# Patient Record
Sex: MALE | Race: WHITE | NOT HISPANIC OR LATINO | Employment: FULL TIME | URBAN - METROPOLITAN AREA
[De-identification: names, ages, dates, MRNs, and addresses within clinical notes are randomized per-mention and may not be internally consistent; named-entity substitution may affect disease eponyms.]

---

## 2017-03-10 ENCOUNTER — OFFICE VISIT (OUTPATIENT)
Dept: ORTHOPEDICS | Facility: CLINIC | Age: 37
End: 2017-03-10
Payer: COMMERCIAL

## 2017-03-10 ENCOUNTER — RADIANT APPOINTMENT (OUTPATIENT)
Dept: GENERAL RADIOLOGY | Facility: CLINIC | Age: 37
End: 2017-03-10
Attending: PEDIATRICS
Payer: COMMERCIAL

## 2017-03-10 VITALS
BODY MASS INDEX: 34.51 KG/M2 | DIASTOLIC BLOOD PRESSURE: 84 MMHG | SYSTOLIC BLOOD PRESSURE: 132 MMHG | HEIGHT: 69 IN | WEIGHT: 233 LBS

## 2017-03-10 DIAGNOSIS — M25.562 PAIN IN BOTH KNEES, UNSPECIFIED CHRONICITY: Primary | ICD-10-CM

## 2017-03-10 DIAGNOSIS — M25.562 LEFT KNEE PAIN, UNSPECIFIED CHRONICITY: ICD-10-CM

## 2017-03-10 DIAGNOSIS — M25.562 PAIN IN BOTH KNEES, UNSPECIFIED CHRONICITY: ICD-10-CM

## 2017-03-10 DIAGNOSIS — M25.561 PAIN IN BOTH KNEES, UNSPECIFIED CHRONICITY: ICD-10-CM

## 2017-03-10 DIAGNOSIS — M25.561 PAIN IN BOTH KNEES, UNSPECIFIED CHRONICITY: Primary | ICD-10-CM

## 2017-03-10 PROCEDURE — 73562 X-RAY EXAM OF KNEE 3: CPT | Mod: RT | Performed by: PEDIATRICS

## 2017-03-10 PROCEDURE — 99204 OFFICE O/P NEW MOD 45 MIN: CPT | Performed by: PEDIATRICS

## 2017-03-10 NOTE — Clinical Note
Geovani GUSMAN was seen in [facility] for knee complaints. MRI left knee next step. Please see copy of the chart note for additional details. Thanks.

## 2017-03-10 NOTE — NURSING NOTE
"Chief Complaint   Patient presents with     Musculoskeletal Problem     bilateral knee pain       Initial /84  Ht 5' 8.5\" (1.74 m)  Wt 233 lb (105.7 kg)  BMI 34.91 kg/m2 Estimated body mass index is 34.91 kg/(m^2) as calculated from the following:    Height as of this encounter: 5' 8.5\" (1.74 m).    Weight as of this encounter: 233 lb (105.7 kg).  Medication Reconciliation: complete       Patient was given home exercise program at the direction of AURELIO SIGALA  that included the following exercise(s) :    Exercise(s) hamstring stretching, calf stretching    Repititions: hold 5 sec, repeat 5x    Times per day: 1-2    Patient demonstrated understanding of and the ability to perform these exercises. Patient was seen for 5 minutes to provide this home exercise program. Patient was directed to discontinue any exercises that cause pain, and to call the clinic if any questions.    "

## 2017-03-10 NOTE — MR AVS SNAPSHOT
After Visit Summary   3/10/2017    Geovani Bella    MRN: [id number]           Patient Information     Date Of Birth          1980        Visit Information        Provider Department      3/10/2017 10:40 AM Abraham Gates,  [facility] [place]        Today's Diagnoses     Pain in both knees, unspecified chronicity    -  1    Left knee pain, unspecified chronicity          Care Instructions     Advanced imaging is done by appointment. Some insurance require a prior authorization to be completed which may delay the time until you are able to schedule your appointment. You should be receiving a call from the scheduling department, if you have not heard from them in 24-48 hours.   Please call [place] [facility]: [phone] to schedule your MRI.  Depending on your availability you can usually schedule within the next 1-2 days.    The clinic will call you with results, if you have not heard from the clinic within 3-4 days following your MRI please contact us at the number listed below.     If you have any further questions for your physician or physician s care team you can call [phone] and use option 3 to leave a voice message. Calls received during business hours will be returned same day.                Follow-ups after your visit        Future tests that were ordered for you today     Open Future Orders        Priority Expected Expires Ordered    MR Knee Left w/o Contrast Routine  3/10/2018 3/10/2017            Who to contact     If you have questions or need follow up information about today's clinic visit or your schedule please contact [facility] [place] directly at [phone].  Normal or non-critical lab and imaging results will be communicated to you by MyChart, letter or phone within 4 business days after the clinic has received the results. If you do not hear from us within 7 days, please contact the  "clinic through [employer] or phone. If you have a critical or abnormal lab result, we will notify you by phone as soon as possible.  Submit refill requests through [employer] or call your pharmacy and they will forward the refill request to us. Please allow 3 business days for your refill to be completed.          Additional Information About Your Visit        [employer] Information     [employer] gives you secure access to your electronic health record. If you see a primary care provider, you can also send messages to your care team and make appointments. If you have questions, please call your primary care clinic.  If you do not have a primary care provider, please call [phone] and they will assist you.        Care EveryWhere ID     This is your Care EveryWhere ID. This could be used by other organizations to access your [place] medical records  [id number]        Your Vitals Were     Height BMI (Body Mass Index)                5' 8.5\" (1.74 m) 34.91 kg/m2           Blood Pressure from Last 3 Encounters:   03/10/17 132/84   11/18/16 122/78   03/13/15 129/70    Weight from Last 3 Encounters:   03/10/17 233 lb (105.7 kg)   11/18/16 235 lb (106.6 kg)   03/13/15 195 lb (88.5 kg)               Primary Care Provider Office Phone # Fax #    Keo Phillips PA-C [phone]       [address] [address]        Thank you!     Thank you for choosing [facility]  for your care. Our goal is always to provide you with excellent care. Hearing back from our patients is one way we can continue to improve our services. Please take a few minutes to complete the written survey that you may receive in the mail after your visit with us. Thank you!             Your Updated Medication List - Protect others around you: Learn how to safely use, store and throw away your medicines at www.disposemymeds.org.          This list is accurate as of: " 3/10/17 11:59 AM.  Always use your most recent med list.                   Brand Name Dispense Instructions for use    albuterol 108 (90 BASE) MCG/ACT Inhaler    PROAIR HFA/PROVENTIL HFA/VENTOLIN HFA    1 Inhaler    Inhale 2 puffs into the lungs every 6 hours as needed for shortness of breath / dyspnea       CLONAZEPAM PO      Take  by mouth 2 times daily.       methylphenidate ER 27 MG CR tablet    CONCERTA     27 mg       * nicotine 14 MG/24HR 24 hr patch    NICODERM CQ    30 patch    Place 1 patch onto the skin every 24 hours       * nicotine 7 MG/24HR 24 hr patch    NICODERM CQ    30 patch    Place 1 patch onto the skin every 24 hours       RELPAX 20 MG tablet   Generic drug:  eletriptan      Take 20 mg by mouth at onset of headache       topiramate 25 MG tablet    TOPAMAX     25 mg       VIAGRA 100 MG cap/tab   Generic drug:  sildenafil      Take 100 mg by mouth daily as needed.       * Notice:  This list has 2 medication(s) that are the same as other medications prescribed for you. Read the directions carefully, and ask your doctor or other care provider to review them with you.

## 2017-03-10 NOTE — PATIENT INSTRUCTIONS
Advanced imaging is done by appointment. Some insurance require a prior authorization to be completed which may delay the time until you are able to schedule your appointment. You should be receiving a call from the scheduling department, if you have not heard from them in 24-48 hours.   Please call [facility] [place] [facility] [place]: [phone] to schedule your MRI.  Depending on your availability you can usually schedule within the next 1-2 days.    The clinic will call you with results, if you have not heard from the clinic within 3-4 days following your MRI please contact us at the number listed below.     If you have any further questions for your physician or physician s care team you can call [phone] and use option 3 to leave a voice message. Calls received during business hours will be returned same day.

## 2017-03-10 NOTE — LETTER
[facility] [place]  [address]  [place] [address]  Phone: [phone]  Fax: [phone]        March 10, 2017      RE:Geovani Bella      To whom it may concern,      Geovani Bella was seen in clinic today.        Sincerely,            Abraham ELIZONDO/navarrete

## 2017-03-20 ENCOUNTER — RADIANT APPOINTMENT (OUTPATIENT)
Dept: MRI IMAGING | Facility: CLINIC | Age: 37
End: 2017-03-20
Attending: PEDIATRICS
Payer: COMMERCIAL

## 2017-03-20 DIAGNOSIS — M25.562 LEFT KNEE PAIN, UNSPECIFIED CHRONICITY: ICD-10-CM

## 2017-03-20 PROCEDURE — 73721 MRI JNT OF LWR EXTRE W/O DYE: CPT | Mod: TC

## 2017-03-21 ENCOUNTER — TELEPHONE (OUTPATIENT)
Dept: ORTHOPEDICS | Facility: CLINIC | Age: 37
End: 2017-03-21

## 2017-03-21 DIAGNOSIS — S83.242A TEAR OF MEDIAL MENISCUS OF LEFT KNEE: Primary | ICD-10-CM

## 2017-03-22 NOTE — TELEPHONE ENCOUNTER
MR LEFT KNEE WITHOUT CONTRAST 3/20/2017 7:33 PM     HISTORY: Medial and anterior knee pain with weakness and instability.  No specific injury.      TECHNIQUE: Axial and coronal T2 with fat suppression. Coronal T1.  Sagittal dual echo T2.     COMPARISON: None.     FINDINGS:   Medial Meniscus: There is a horizontal tear extending from the  periphery to the inferior surface near the free edge throughout the  mid body and the posterior horn. There are two separate associated  parameniscal cysts. One of these is located adjacent to the anterior  aspect of the mid body measuring 1.4 x 0.8 x 2.3 cm. The other is  located adjacent to the junction of the posterior horn and mid body  measuring 1.7 x 0.8 x 1.7 cm. The medial collateral ligament is  interposed between the two parameniscal cysts, and there is no  conspicuous communication between the two cysts (image 13 of series  3). The anterior horn is unremarkable. No displaced meniscal fragments  or flaps.      Lateral Meniscus: No tear, displaced fragment, or extrusion.      Anterior Cruciate Ligament: Unremarkable.      Posterior Cruciate Ligament: Unremarkable.      Medial Collateral Ligament: Unremarkable.     Lateral Collateral Ligament Complex, Popliteus Tendon: The iliotibial  band, fibular collateral ligament, biceps femoris tendon, and  popliteus tendon are unremarkable.     Osseous and Cartilaginous Structures: No acute-appearing bony  abnormalities. There is mild diffuse grade II chondromalacia in the  weightbearing medial compartment. Articular cartilages in the lateral  and patellofemoral compartments appear normal.     Extensor Mechanism: The quadriceps and patellar tendons are  unremarkable. The medial and lateral patellar retinacula are normal.     Joint Space: No joint effusion. No definite loose bodies appreciated.     Additional Findings: Very small popliteal cyst. No  semimembranosus-tibial collateral ligament or pes anserine  bursitis.         IMPRESSION:   1. Horizontal tear involving the mid body and posterior horn of the  medial meniscus. There are two associated parameniscal cysts.  2. 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     LATOYA AMAYA MD

## 2017-03-24 NOTE — TELEPHONE ENCOUNTER
"Medial meniscus tear left. Also some medial chondromalacia.  Options: 1) trial steroid injection for pain relief, 2) rehab (will not \"fix\" tear, but can address knee mechanics), 3) referral to ortho surgeon for further evaluation.  Would offer a steroid injection to start, if he is interested. Thanks.  Abraham Gates, DO, CAQ    "

## 2017-03-27 ENCOUNTER — OFFICE VISIT (OUTPATIENT)
Dept: FAMILY MEDICINE | Facility: CLINIC | Age: 37
End: 2017-03-27
Payer: COMMERCIAL

## 2017-03-27 VITALS
TEMPERATURE: 98.1 F | WEIGHT: 226 LBS | BODY MASS INDEX: 33.86 KG/M2 | HEART RATE: 64 BPM | SYSTOLIC BLOOD PRESSURE: 118 MMHG | DIASTOLIC BLOOD PRESSURE: 80 MMHG

## 2017-03-27 DIAGNOSIS — F41.1 GAD (GENERALIZED ANXIETY DISORDER): Primary | ICD-10-CM

## 2017-03-27 DIAGNOSIS — F33.1 MAJOR DEPRESSIVE DISORDER, RECURRENT EPISODE, MODERATE (H): ICD-10-CM

## 2017-03-27 DIAGNOSIS — F43.10 PTSD (POST-TRAUMATIC STRESS DISORDER): ICD-10-CM

## 2017-03-27 PROCEDURE — 99214 OFFICE O/P EST MOD 30 MIN: CPT | Performed by: PHYSICIAN ASSISTANT

## 2017-03-27 RX ORDER — BUSPIRONE HYDROCHLORIDE 15 MG/1
15 TABLET ORAL 2 TIMES DAILY
Qty: 180 TABLET | Refills: 3 | COMMUNITY
Start: 2017-03-27 | End: 2019-06-27

## 2017-03-27 NOTE — MR AVS SNAPSHOT
After Visit Summary   3/27/2017    Geovani Bella    MRN: [id number]           Patient Information     Date Of Birth          1980        Visit Information        Provider Department      3/27/2017 11:20 AM Keo Phillips PA-C [facility]        Today's Diagnoses     DAVONTE (generalized anxiety disorder)    -  1    Major depressive disorder, recurrent episode, moderate (H)        PTSD (post-traumatic stress disorder)          Care Instructions    [facility] - Therapy - [phone]        Follow-ups after your visit        Who to contact     If you have questions or need follow up information about today's clinic visit or your schedule please contact [facility] directly at [phone].  Normal or non-critical lab and imaging results will be communicated to you by MyChart, letter or phone within 4 business days after the clinic has received the results. If you do not hear from us within 7 days, please contact the clinic through [employer] or phone. If you have a critical or abnormal lab result, we will notify you by phone as soon as possible.  Submit refill requests through [employer] or call your pharmacy and they will forward the refill request to us. Please allow 3 business days for your refill to be completed.          Additional Information About Your Visit        MyChart Information     [employer] gives you secure access to your electronic health record. If you see a primary care provider, you can also send messages to your care team and make appointments. If you have questions, please call your primary care clinic.  If you do not have a primary care provider, please call [phone] and they will assist you.        Care EveryWhere ID     This is your Care EveryWhere ID. This could be used by other organizations to access your [facility] medical records  [id number]        Your Vitals Were     Pulse Temperature BMI (Body Mass Index)             64 98.1   F (36.7  C) (Oral) 33.86 kg/m2          Blood Pressure from Last 3 Encounters:   03/27/17 118/80   03/10/17 132/84   11/18/16 122/78    Weight from Last 3 Encounters:   03/27/17 226 lb (102.5 kg)   03/10/17 233 lb (105.7 kg)   11/18/16 235 lb (106.6 kg)              Today, you had the following     No orders found for display       Primary Care Provider Office Phone # Fax #    Keo Phillips PA-C [phone]       [facility] [address] [address]        Thank you!     Thank you for choosing [facility]  for your care. Our goal is always to provide you with excellent care. Hearing back from our patients is one way we can continue to improve our services. Please take a few minutes to complete the written survey that you may receive in the mail after your visit with us. Thank you!             Your Updated Medication List - Protect others around you: Learn how to safely use, store and throw away your medicines at www.disposemymeds.org.          This list is accurate as of: 3/27/17 12:10 PM.  Always use your most recent med list.                   Brand Name Dispense Instructions for use    albuterol 108 (90 BASE) MCG/ACT Inhaler    PROAIR HFA/PROVENTIL HFA/VENTOLIN HFA    1 Inhaler    Inhale 2 puffs into the lungs every 6 hours as needed for shortness of breath / dyspnea       busPIRone 15 MG tablet    BUSPAR    180 tablet    Take 1 tablet (15 mg) by mouth 2 times daily       CLONAZEPAM PO      Take  by mouth 2 times daily.       methylphenidate ER 27 MG CR tablet    CONCERTA     27 mg       * nicotine 14 MG/24HR 24 hr patch    NICODERM CQ    30 patch    Place 1 patch onto the skin every 24 hours       * nicotine 7 MG/24HR 24 hr patch    NICODERM CQ    30 patch    Place 1 patch onto the skin every 24 hours       RELPAX 20 MG tablet   Generic drug:  eletriptan      Take 20 mg by mouth at onset of headache Reported on 3/27/2017       topiramate  25 MG tablet    TOPAMAX     25 mg       VIAGRA 100 MG cap/tab   Generic drug:  sildenafil      Take 100 mg by mouth daily as needed.       * Notice:  This list has 2 medication(s) that are the same as other medications prescribed for you. Read the directions carefully, and ask your doctor or other care provider to review them with you.
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
## 2017-03-27 NOTE — NURSING NOTE
"Chief Complaint   Patient presents with     Forms     FMLA        Initial /80 (BP Location: Right arm, Patient Position: Chair, Cuff Size: Adult Regular)  Pulse 64  Temp 98.1  F (36.7  C) (Oral)  Wt 226 lb (102.5 kg)  BMI 33.86 kg/m2 Estimated body mass index is 33.86 kg/(m^2) as calculated from the following:    Height as of 3/10/17: 5' 8.5\" (1.74 m).    Weight as of this encounter: 226 lb (102.5 kg).  Medication Reconciliation: complete   Anne Koch ma      "

## 2017-03-27 NOTE — LETTER
[facility]  [address] [place] [id number] [phone]      March 27, 2017      RE: Geovani Bella  [address] [place] [id number]          To Whom it May Concern:    Geovani has PTSD, Anxiety, Depression issues. He also has left knee problems.     His PTSD, Anxiety and depression flare from time to time and he has issues that result in need for time off from work. He has FMLA on hand for this.     He recently missed the days of March 22nd - 24th and March 27th for issues related to his anxiety and mood as he says he was getting pressure to work related to his knee pain. I didn't see him for this time but he reports he missed work because of mood, anxiety, PTSD flare.     Please let me know if you have questions.     Sincerely,

## 2017-03-28 ASSESSMENT — PATIENT HEALTH QUESTIONNAIRE - PHQ9: SUM OF ALL RESPONSES TO PHQ QUESTIONS 1-9: 24

## 2017-04-04 ENCOUNTER — OFFICE VISIT (OUTPATIENT)
Dept: ORTHOPEDICS | Facility: CLINIC | Age: 37
End: 2017-04-04

## 2017-04-04 DIAGNOSIS — S83.242A TEAR OF MEDIAL MENISCUS OF LEFT KNEE: Primary | ICD-10-CM

## 2017-04-04 DIAGNOSIS — Z53.9 ERRONEOUS ENCOUNTER--DISREGARD: ICD-10-CM

## 2017-04-04 DIAGNOSIS — M25.562 PAIN IN BOTH KNEES, UNSPECIFIED CHRONICITY: ICD-10-CM

## 2017-04-04 DIAGNOSIS — M25.561 PAIN IN BOTH KNEES, UNSPECIFIED CHRONICITY: ICD-10-CM

## 2017-04-04 NOTE — PROGRESS NOTES
Sports Medicine Clinic Visit    PCP: Keo Phillips    Geovani Bella is a 36 year old male who is seen in f/u up for    Tear of medial meniscus of left knee  Pain in both knees, unspecified chronicity.     Patient believed he was seen the orthopedic surgeon.  Will disregard appointment today and an appointment was made with Dr. Mclaughlin.  Jimena Tamayo MS [facility]        This encounter was opened in error. Please disregard.

## 2017-04-04 NOTE — MR AVS SNAPSHOT
After Visit Summary   4/4/2017    Geovani Bella    MRN: [id number]           Patient Information     Date Of Birth          1980        Visit Information        Provider Department      4/4/2017 4:40 PM Abraham Gates,  [facility] [place]        Today's Diagnoses     Tear of medial meniscus of left knee    -  1    Pain in both knees, unspecified chronicity        ERRONEOUS ENCOUNTER--DISREGARD           Follow-ups after your visit        Your next 10 appointments already scheduled     Apr 06, 2017  1:30 PM CDT   New Visit with Reinaldo Mclaughlin MD   [facility] [place] ([facility] [place])    [address] [facility] [address]  [place] [address]   [phone]            May 24, 2017 10:30 AM CDT   New Visit with Fco [facility] ([facility])    [address] [place] [id number] [phone]            May 31, 2017  9:30 AM CDT   Return Visit with Fco Jackson [facility] ([facility])    [address] [place] [id number] [phone]              Who to contact     If you have questions or need follow up information about today's clinic visit or your schedule please contact [facility] [place] directly at [phone].  Normal or non-critical lab and imaging results will be communicated to you by MyChart, letter or phone within 4 business days after the clinic has received the results. If you do not hear from us within 7 days, please contact the clinic through MyChart or phone. If you have a critical or abnormal lab result, we will notify you by phone as soon as possible.  Submit refill requests through [employer] or call your pharmacy and they will forward the refill request to us. Please allow 3 business days for your refill to be completed.           Additional Information About Your Visit        [employer] Information     [employer] gives you secure access to your electronic health record. If you see a primary care provider, you can also send messages to your care team and make appointments. If you have questions, please call your primary care clinic.  If you do not have a primary care provider, please call [phone] and they will assist you.        Care EveryWhere ID     This is your Care EveryWhere ID. This could be used by other organizations to access your [facility] medical records  [id number]         Blood Pressure from Last 3 Encounters:   03/27/17 118/80   03/10/17 132/84   11/18/16 122/78    Weight from Last 3 Encounters:   03/27/17 226 lb (102.5 kg)   03/10/17 233 lb (105.7 kg)   11/18/16 235 lb (106.6 kg)              Today, you had the following     No orders found for display       Primary Care Provider Office Phone # Fax #    Keo Phillips PA-C [phone]       [address] [address]        Thank you!     Thank you for choosing [facility]  for your care. Our goal is always to provide you with excellent care. Hearing back from our patients is one way we can continue to improve our services. Please take a few minutes to complete the written survey that you may receive in the mail after your visit with us. Thank you!             Your Updated Medication List - Protect others around you: Learn how to safely use, store and throw away your medicines at www.disposemymeds.org.          This list is accurate as of: 4/4/17  4:44 PM.  Always use your most recent med list.                   Brand Name Dispense Instructions for use    albuterol 108 (90 BASE) MCG/ACT Inhaler    PROAIR HFA/PROVENTIL HFA/VENTOLIN HFA    1 Inhaler    Inhale 2 puffs into the lungs every 6 hours as needed for shortness of breath / dyspnea       busPIRone 15 MG tablet    BUSPAR    180  tablet    Take 1 tablet (15 mg) by mouth 2 times daily       CLONAZEPAM PO      Take  by mouth 2 times daily.       methylphenidate ER 27 MG CR tablet    CONCERTA     27 mg       * nicotine 14 MG/24HR 24 hr patch    NICODERM CQ    30 patch    Place 1 patch onto the skin every 24 hours       * nicotine 7 MG/24HR 24 hr patch    NICODERM CQ    30 patch    Place 1 patch onto the skin every 24 hours       RELPAX 20 MG tablet   Generic drug:  eletriptan      Take 20 mg by mouth at onset of headache Reported on 3/27/2017       topiramate 25 MG tablet    TOPAMAX     25 mg       VIAGRA 100 MG cap/tab   Generic drug:  sildenafil      Take 100 mg by mouth daily as needed.       * Notice:  This list has 2 medication(s) that are the same as other medications prescribed for you. Read the directions carefully, and ask your doctor or other care provider to review them with you.

## 2017-04-05 NOTE — PROGRESS NOTES
Chart reviewed.  Encounter was not reviewed with provider.  Patient was not examined by me.  Yessi Smith MD

## 2017-04-06 ENCOUNTER — OFFICE VISIT (OUTPATIENT)
Dept: ORTHOPEDICS | Facility: CLINIC | Age: 37
End: 2017-04-06
Payer: COMMERCIAL

## 2017-04-06 VITALS — WEIGHT: 220 LBS | RESPIRATION RATE: 16 BRPM | BODY MASS INDEX: 32.58 KG/M2 | HEIGHT: 69 IN

## 2017-04-06 DIAGNOSIS — S83.242A TEAR OF MEDIAL MENISCUS OF LEFT KNEE, CURRENT, UNSPECIFIED TEAR TYPE, INITIAL ENCOUNTER: Primary | ICD-10-CM

## 2017-04-06 PROCEDURE — 99244 OFF/OP CNSLTJ NEW/EST MOD 40: CPT | Performed by: ORTHOPAEDIC SURGERY

## 2017-04-06 ASSESSMENT — PAIN SCALES - GENERAL: PAINLEVEL: EXTREME PAIN (8)
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
## 2017-04-06 NOTE — NURSING NOTE
"Chief Complaint   Patient presents with     Knee Pain     Possible worker's comp clain. Left medial joint line pain. Onset: 2 months.        Initial Resp 16  Ht 1.74 m (5' 8.5\")  Wt 99.8 kg (220 lb)  BMI 32.96 kg/m2 Estimated body mass index is 32.96 kg/(m^2) as calculated from the following:    Height as of this encounter: 1.74 m (5' 8.5\").    Weight as of this encounter: 99.8 kg (220 lb).  Medication Reconciliation: complete   Mars Holley PA-C, CAQ (Ortho)  Supervising Physician: Reinaldo Mclaughlin M.D., M.S.  Dept. of Orthopaedic Surgery  [facility]      "

## 2017-04-06 NOTE — PATIENT INSTRUCTIONS
Please remember to call and schedule a follow up appointment if one was recommended at your earliest convenience.  Orthopedics CLINIC HOURS TELEPHONE NUMBER   Dr. Arnoldo Feldman  Certified Medical Assistant   Monday & Wednesday   8am - 5pm  Thursday 1pm - 5pm  Friday 8am -11:30am Specialty schedulers:   [phone] to schedule your surgery.  Main Clinic:   [phone] to make an appointment with any provider.    Urgent Care locations:    [facility] Monday-Friday Closed  Saturday-Sunday 9am-5pm      Monday-Friday 12pm - 8pm  Saturday-Sunday 9am-5pm [phone]     If SURGERY has been recommended, please call our Specialty Schedulers at [phone] to schedule your procedure.    If you need a medication refill, please contact your pharmacy. Please allow 3 business days for your refill to be completed.    If an MRI or CT scan has been recommended, please call [facility] Schedulers at [phone] to schedule your appointment.  Use Trendyol (secure e-mail communication and access to your chart) to send a message or to make an appointment. Please ask how you can sign up for T[employer]ol.  Your care team's suggested websites for health information:   [email or web address] : Up to date and easily searchable information on multiple topics.   [email or web address] : [place] dept of heat, public health issues in [place], N1N1

## 2017-04-13 ENCOUNTER — OFFICE VISIT (OUTPATIENT)
Dept: FAMILY MEDICINE | Facility: CLINIC | Age: 37
End: 2017-04-13
Payer: COMMERCIAL

## 2017-04-13 VITALS
DIASTOLIC BLOOD PRESSURE: 74 MMHG | BODY MASS INDEX: 32.58 KG/M2 | WEIGHT: 220 LBS | HEART RATE: 60 BPM | HEIGHT: 69 IN | SYSTOLIC BLOOD PRESSURE: 122 MMHG | TEMPERATURE: 98.4 F

## 2017-04-13 DIAGNOSIS — Z01.818 PREOP GENERAL PHYSICAL EXAM: Primary | ICD-10-CM

## 2017-04-13 DIAGNOSIS — M25.562 CHRONIC PAIN OF LEFT KNEE: ICD-10-CM

## 2017-04-13 DIAGNOSIS — G89.29 CHRONIC PAIN OF LEFT KNEE: ICD-10-CM

## 2017-04-13 PROCEDURE — 99214 OFFICE O/P EST MOD 30 MIN: CPT | Performed by: PHYSICIAN ASSISTANT

## 2017-04-13 NOTE — MR AVS SNAPSHOT
After Visit Summary   4/13/2017    Geovani Bella    MRN: [id number]           Patient Information     Date Of Birth          1980        Visit Information        Provider Department      4/13/2017 2:40 PM Keo Phillips PA-C [facility]        Today's Diagnoses     Preop general physical exam    -  1    Chronic pain of left knee          Care Instructions      Before Your Surgery      Call your surgeon if there is any change in your health. This includes signs of a cold or flu (such as a sore throat, runny nose, cough, rash or fever).    Do not smoke, drink alcohol or take over the counter medicine (unless your surgeon or primary care doctor tells you to) for the 24 hours before and after surgery.    If you take prescribed drugs: Follow your doctor s orders about which medicines to take and which to stop until after surgery.    Eating and drinking prior to surgery: follow the instructions from your surgeon    Take a shower or bath the night before surgery. Use the soap your surgeon gave you to gently clean your skin. If you do not have soap from your surgeon, use your regular soap. Do not shave or scrub the surgery site.  Wear clean pajamas and have clean sheets on your bed.         Follow-ups after your visit        Your next 10 appointments already scheduled     Apr 13, 2017  2:40 PM CDT   Pre-Op physical with Keo Phillips PA-C   [facility]    [address] [address]   [phone]            Apr 20, 2017   Procedure with Reinaldo Mclaughlin MD   [facility] (--)    [address] [place] [address]   [phone]            May 04, 2017  1:15 PM CDT   Return Visit with Reinaldo Mclaughlin MD   [facility] [place] ([facility] [place])    [address]  [place] [id number] [phone]            May 24,  "2017 10:30 AM CDT   New Visit with Fco Jackson, [facility] ([facility])    [address] [place] [address] [phone]            May 31, 2017  9:30 AM CDT   Return Visit with Fco Jackson, [facility] ([address] [address] [phone]              Who to contact     If you have questions or need follow up information about today's clinic visit or your schedule please contact [facility] directly at [phone].  Normal or non-critical lab and imaging results will be communicated to you by Genomedhart, letter or phone within 4 business days after the clinic has received the results. If you do not hear from us within 7 days, please contact the clinic through [employer] or phone. If you have a critical or abnormal lab result, we will notify you by phone as soon as possible.  Submit refill requests through [employer] or call your pharmacy and they will forward the refill request to us. Please allow 3 business days for your refill to be completed.          Additional Information About Your Visit        [email or web address] Information     [employer] gives you secure access to your electronic health record. If you see a primary care provider, you can also send messages to your care team and make appointments. If you have questions, please call your primary care clinic.  If you do not have a primary care provider, please call [phone] and they will assist you.        Care EveryWhere ID     This is your Care EveryWhere ID. This could be used by other organizations to access your [facility] medical records  [id number]        Your Vitals Were     Pulse Temperature Height BMI (Body Mass Index)          60 98.4  F (36.9  C) (Oral) 5' 8.5\" (1.74 m) 32.96 kg/m2         Blood Pressure from Last 3 Encounters:   04/13/17 122/74   03/27/17 118/80   03/10/17 132/84 "    Weight from Last 3 Encounters:   04/13/17 220 lb (99.8 kg)   04/06/17 220 lb (99.8 kg)   03/27/17 226 lb (102.5 kg)              Today, you had the following     No orders found for display       Primary Care Provider Office Phone # Fax #    Keo Phillips PA-C [phone]       [facility] [address] [place] [address]        Thank you!     Thank you for choosing [facility]  for your care. Our goal is always to provide you with excellent care. Hearing back from our patients is one way we can continue to improve our services. Please take a few minutes to complete the written survey that you may receive in the mail after your visit with us. Thank you!             Your Updated Medication List - Protect others around you: Learn how to safely use, store and throw away your medicines at www.disposemymeds.org.          This list is accurate as of: 4/13/17  2:35 PM.  Always use your most recent med list.                   Brand Name Dispense Instructions for use    albuterol 108 (90 BASE) MCG/ACT Inhaler    PROAIR HFA/PROVENTIL HFA/VENTOLIN HFA    1 Inhaler    Inhale 2 puffs into the lungs every 6 hours as needed for shortness of breath / dyspnea       busPIRone 15 MG tablet    BUSPAR    180 tablet    Take 1 tablet (15 mg) by mouth 2 times daily       CLONAZEPAM PO      Take  by mouth 2 times daily.       methylphenidate ER 27 MG CR tablet    CONCERTA     27 mg       VIAGRA 100 MG cap/tab   Generic drug:  sildenafil      Take 100 mg by mouth daily as needed.

## 2017-04-13 NOTE — NURSING NOTE
"Chief Complaint   Patient presents with     Pre-Op Exam       Initial /74 (BP Location: Right arm, Cuff Size: Adult Large)  Pulse 60  Temp 98.4  F (36.9  C) (Oral)  Ht 5' 8.5\" (1.74 m)  Wt 220 lb (99.8 kg)  BMI 32.96 kg/m2 Estimated body mass index is 32.96 kg/(m^2) as calculated from the following:    Height as of this encounter: 5' 8.5\" (1.74 m).    Weight as of this encounter: 220 lb (99.8 kg).  Medication Reconciliation: complete     Antonella Izaguirre MA     "
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
## 2017-04-17 ENCOUNTER — TELEPHONE (OUTPATIENT)
Dept: FAMILY MEDICINE | Facility: CLINIC | Age: 37
End: 2017-04-17

## 2017-04-17 NOTE — TELEPHONE ENCOUNTER
Forms received from patient for Marciano Phillips PA-C.  Forms placed in provider 'sign me' folder.  Please mail forms to [employer], [address], [place] [address] after completion.    Yuridia Peralta,

## 2017-04-17 NOTE — TELEPHONE ENCOUNTER
Reason for Call:  Form, our goal is to have forms completed with 72 hours, however, some forms may require a visit or additional information.    Type of letter, form or note:  medical    Who is the form from?: Patient    Where did the form come from: Patient or family brought in       What clinic location was the form placed at?: [facility])    Where the form was placed: 's Box    What number is listed as a contact on the form?: [phone]       Additional comments: Please mail when complete to   [address], [facility] [address]  Call taken on 4/17/2017 at 1:17 PM by Maureen Sauer

## 2017-04-20 ENCOUNTER — ANESTHESIA (OUTPATIENT)
Dept: SURGERY | Facility: AMBULATORY SURGERY CENTER | Age: 37
End: 2017-04-20

## 2017-04-20 ENCOUNTER — ANESTHESIA EVENT (OUTPATIENT)
Dept: SURGERY | Facility: AMBULATORY SURGERY CENTER | Age: 37
End: 2017-04-20

## 2017-04-20 ENCOUNTER — HOSPITAL ENCOUNTER (OUTPATIENT)
Facility: AMBULATORY SURGERY CENTER | Age: 37
Discharge: HOME | End: 2017-04-20
Attending: ORTHOPAEDIC SURGERY | Admitting: ORTHOPAEDIC SURGERY
Payer: COMMERCIAL

## 2017-04-20 ENCOUNTER — SURGERY (OUTPATIENT)
Age: 37
End: 2017-04-20

## 2017-04-20 VITALS
DIASTOLIC BLOOD PRESSURE: 80 MMHG | RESPIRATION RATE: 20 BRPM | SYSTOLIC BLOOD PRESSURE: 140 MMHG | OXYGEN SATURATION: 98 % | TEMPERATURE: 97.8 F

## 2017-04-20 DIAGNOSIS — S83.232D COMPLEX TEAR OF MEDIAL MENISCUS OF LEFT KNEE AS CURRENT INJURY, SUBSEQUENT ENCOUNTER: Primary | ICD-10-CM

## 2017-04-20 PROCEDURE — 29881 ARTHRS KNE SRG MNISECTMY M/L: CPT | Mod: LT

## 2017-04-20 PROCEDURE — 29881 ARTHRS KNE SRG MNISECTMY M/L: CPT | Mod: LT | Performed by: ORTHOPAEDIC SURGERY

## 2017-04-20 PROCEDURE — G8916 PT W IV AB GIVEN ON TIME: HCPCS

## 2017-04-20 PROCEDURE — G8907 PT DOC NO EVENTS ON DISCHARG: HCPCS

## 2017-04-20 RX ORDER — CEFAZOLIN SODIUM 2 G/100ML
2 INJECTION, SOLUTION INTRAVENOUS
Status: COMPLETED | OUTPATIENT
Start: 2017-04-20 | End: 2017-04-20

## 2017-04-20 RX ORDER — LIDOCAINE 40 MG/G
CREAM TOPICAL
Status: DISCONTINUED | OUTPATIENT
Start: 2017-04-20 | End: 2017-04-21 | Stop reason: HOSPADM

## 2017-04-20 RX ORDER — HYDROXYZINE HYDROCHLORIDE 25 MG/1
25 TABLET, FILM COATED ORAL
Status: DISCONTINUED | OUTPATIENT
Start: 2017-04-20 | End: 2017-04-21 | Stop reason: HOSPADM

## 2017-04-20 RX ORDER — IBUPROFEN 600 MG/1
600 TABLET, FILM COATED ORAL
Status: DISCONTINUED | OUTPATIENT
Start: 2017-04-20 | End: 2017-04-21 | Stop reason: HOSPADM

## 2017-04-20 RX ORDER — ONDANSETRON 2 MG/ML
INJECTION INTRAMUSCULAR; INTRAVENOUS PRN
Status: DISCONTINUED | OUTPATIENT
Start: 2017-04-20 | End: 2017-04-20

## 2017-04-20 RX ORDER — AMOXICILLIN 250 MG
1-2 CAPSULE ORAL 2 TIMES DAILY
Qty: 30 TABLET | Refills: 0 | Status: SHIPPED | OUTPATIENT
Start: 2017-04-20 | End: 2017-09-22

## 2017-04-20 RX ORDER — GLYCOPYRROLATE 0.2 MG/ML
INJECTION, SOLUTION INTRAMUSCULAR; INTRAVENOUS PRN
Status: DISCONTINUED | OUTPATIENT
Start: 2017-04-20 | End: 2017-04-20

## 2017-04-20 RX ORDER — METHOCARBAMOL 750 MG/1
750 TABLET, FILM COATED ORAL
Status: DISCONTINUED | OUTPATIENT
Start: 2017-04-20 | End: 2017-04-21 | Stop reason: HOSPADM

## 2017-04-20 RX ORDER — LIDOCAINE HYDROCHLORIDE 20 MG/ML
INJECTION, SOLUTION INFILTRATION; PERINEURAL PRN
Status: DISCONTINUED | OUTPATIENT
Start: 2017-04-20 | End: 2017-04-20

## 2017-04-20 RX ORDER — DEXAMETHASONE SODIUM PHOSPHATE 4 MG/ML
INJECTION, SOLUTION INTRA-ARTICULAR; INTRALESIONAL; INTRAMUSCULAR; INTRAVENOUS; SOFT TISSUE PRN
Status: DISCONTINUED | OUTPATIENT
Start: 2017-04-20 | End: 2017-04-20

## 2017-04-20 RX ORDER — GABAPENTIN 300 MG/1
300 CAPSULE ORAL ONCE
Status: COMPLETED | OUTPATIENT
Start: 2017-04-20 | End: 2017-04-20

## 2017-04-20 RX ORDER — ONDANSETRON 4 MG/1
4 TABLET, ORALLY DISINTEGRATING ORAL
Status: DISCONTINUED | OUTPATIENT
Start: 2017-04-20 | End: 2017-04-21 | Stop reason: HOSPADM

## 2017-04-20 RX ORDER — KETOROLAC TROMETHAMINE 30 MG/ML
INJECTION, SOLUTION INTRAMUSCULAR; INTRAVENOUS PRN
Status: DISCONTINUED | OUTPATIENT
Start: 2017-04-20 | End: 2017-04-20

## 2017-04-20 RX ORDER — FENTANYL CITRATE 50 UG/ML
INJECTION, SOLUTION INTRAMUSCULAR; INTRAVENOUS PRN
Status: DISCONTINUED | OUTPATIENT
Start: 2017-04-20 | End: 2017-04-20

## 2017-04-20 RX ORDER — HYDROCODONE BITARTRATE AND ACETAMINOPHEN 5; 325 MG/1; MG/1
1-2 TABLET ORAL EVERY 6 HOURS PRN
Qty: 30 TABLET | Refills: 0 | Status: SHIPPED | OUTPATIENT
Start: 2017-04-20 | End: 2017-04-28

## 2017-04-20 RX ORDER — EPHEDRINE SULFATE 50 MG/ML
INJECTION, SOLUTION INTRAMUSCULAR; INTRAVENOUS; SUBCUTANEOUS PRN
Status: DISCONTINUED | OUTPATIENT
Start: 2017-04-20 | End: 2017-04-20

## 2017-04-20 RX ORDER — SODIUM CHLORIDE, SODIUM LACTATE, POTASSIUM CHLORIDE, CALCIUM CHLORIDE 600; 310; 30; 20 MG/100ML; MG/100ML; MG/100ML; MG/100ML
INJECTION, SOLUTION INTRAVENOUS CONTINUOUS
Status: DISCONTINUED | OUTPATIENT
Start: 2017-04-20 | End: 2017-04-21 | Stop reason: HOSPADM

## 2017-04-20 RX ORDER — ONDANSETRON 2 MG/ML
4 INJECTION INTRAMUSCULAR; INTRAVENOUS EVERY 30 MIN PRN
Status: DISCONTINUED | OUTPATIENT
Start: 2017-04-20 | End: 2017-04-21 | Stop reason: HOSPADM

## 2017-04-20 RX ORDER — BUPIVACAINE HYDROCHLORIDE 2.5 MG/ML
INJECTION, SOLUTION INFILTRATION; PERINEURAL PRN
Status: DISCONTINUED | OUTPATIENT
Start: 2017-04-20 | End: 2017-04-20 | Stop reason: HOSPADM

## 2017-04-20 RX ORDER — PROPOFOL 10 MG/ML
INJECTION, EMULSION INTRAVENOUS PRN
Status: DISCONTINUED | OUTPATIENT
Start: 2017-04-20 | End: 2017-04-20

## 2017-04-20 RX ORDER — FENTANYL CITRATE 50 UG/ML
25-50 INJECTION, SOLUTION INTRAMUSCULAR; INTRAVENOUS
Status: DISCONTINUED | OUTPATIENT
Start: 2017-04-20 | End: 2017-04-21 | Stop reason: HOSPADM

## 2017-04-20 RX ORDER — OXYCODONE HYDROCHLORIDE 5 MG/1
5-10 TABLET ORAL
Status: COMPLETED | OUTPATIENT
Start: 2017-04-20 | End: 2017-04-20

## 2017-04-20 RX ORDER — NALOXONE HYDROCHLORIDE 0.4 MG/ML
.1-.4 INJECTION, SOLUTION INTRAMUSCULAR; INTRAVENOUS; SUBCUTANEOUS
Status: DISCONTINUED | OUTPATIENT
Start: 2017-04-20 | End: 2017-04-21 | Stop reason: HOSPADM

## 2017-04-20 RX ORDER — MEPERIDINE HYDROCHLORIDE 25 MG/ML
12.5 INJECTION INTRAMUSCULAR; INTRAVENOUS; SUBCUTANEOUS
Status: DISCONTINUED | OUTPATIENT
Start: 2017-04-20 | End: 2017-04-21 | Stop reason: HOSPADM

## 2017-04-20 RX ORDER — ONDANSETRON 4 MG/1
4 TABLET, ORALLY DISINTEGRATING ORAL EVERY 30 MIN PRN
Status: DISCONTINUED | OUTPATIENT
Start: 2017-04-20 | End: 2017-04-21 | Stop reason: HOSPADM

## 2017-04-20 RX ORDER — FENTANYL CITRATE 50 UG/ML
25-50 INJECTION, SOLUTION INTRAMUSCULAR; INTRAVENOUS EVERY 5 MIN PRN
Status: DISCONTINUED | OUTPATIENT
Start: 2017-04-20 | End: 2017-04-21 | Stop reason: HOSPADM

## 2017-04-20 RX ORDER — ACETAMINOPHEN 325 MG/1
975 TABLET ORAL ONCE
Status: COMPLETED | OUTPATIENT
Start: 2017-04-20 | End: 2017-04-20

## 2017-04-20 RX ADMIN — PROPOFOL 200 MG: 10 INJECTION, EMULSION INTRAVENOUS at 09:15

## 2017-04-20 RX ADMIN — DEXAMETHASONE SODIUM PHOSPHATE 4 MG: 4 INJECTION, SOLUTION INTRA-ARTICULAR; INTRALESIONAL; INTRAMUSCULAR; INTRAVENOUS; SOFT TISSUE at 09:11

## 2017-04-20 RX ADMIN — KETOROLAC TROMETHAMINE 30 MG: 30 INJECTION, SOLUTION INTRAMUSCULAR; INTRAVENOUS at 09:59

## 2017-04-20 RX ADMIN — FENTANYL CITRATE 100 MCG: 50 INJECTION, SOLUTION INTRAMUSCULAR; INTRAVENOUS at 09:30

## 2017-04-20 RX ADMIN — FENTANYL CITRATE 100 MCG: 50 INJECTION, SOLUTION INTRAMUSCULAR; INTRAVENOUS at 09:11

## 2017-04-20 RX ADMIN — EPHEDRINE SULFATE 5 MG: 50 INJECTION, SOLUTION INTRAMUSCULAR; INTRAVENOUS; SUBCUTANEOUS at 09:18

## 2017-04-20 RX ADMIN — PROPOFOL 150 MG: 10 INJECTION, EMULSION INTRAVENOUS at 09:30

## 2017-04-20 RX ADMIN — EPHEDRINE SULFATE 5 MG: 50 INJECTION, SOLUTION INTRAMUSCULAR; INTRAVENOUS; SUBCUTANEOUS at 09:21

## 2017-04-20 RX ADMIN — ONDANSETRON 4 MG: 2 INJECTION INTRAMUSCULAR; INTRAVENOUS at 09:11

## 2017-04-20 RX ADMIN — LIDOCAINE HYDROCHLORIDE 40 MG: 20 INJECTION, SOLUTION INFILTRATION; PERINEURAL at 09:15

## 2017-04-20 RX ADMIN — SODIUM CHLORIDE, SODIUM LACTATE, POTASSIUM CHLORIDE, CALCIUM CHLORIDE: 600; 310; 30; 20 INJECTION, SOLUTION INTRAVENOUS at 09:11

## 2017-04-20 RX ADMIN — SODIUM CHLORIDE, SODIUM LACTATE, POTASSIUM CHLORIDE, CALCIUM CHLORIDE: 600; 310; 30; 20 INJECTION, SOLUTION INTRAVENOUS at 08:40

## 2017-04-20 RX ADMIN — BUPIVACAINE HYDROCHLORIDE 30 ML: 2.5 INJECTION, SOLUTION INFILTRATION; PERINEURAL at 09:55

## 2017-04-20 RX ADMIN — GLYCOPYRROLATE 0.2 MG: 0.2 INJECTION, SOLUTION INTRAMUSCULAR; INTRAVENOUS at 09:17

## 2017-04-20 RX ADMIN — ACETAMINOPHEN 975 MG: 325 TABLET ORAL at 08:40

## 2017-04-20 RX ADMIN — GABAPENTIN 300 MG: 300 CAPSULE ORAL at 08:40

## 2017-04-20 RX ADMIN — OXYCODONE HYDROCHLORIDE 5 MG: 5 TABLET ORAL at 10:25

## 2017-04-20 RX ADMIN — CEFAZOLIN SODIUM 2 G: 2 INJECTION, SOLUTION INTRAVENOUS at 09:11

## 2017-04-20 NOTE — ANESTHESIA CARE TRANSFER NOTE
Patient: Geovani Bella    Procedure(s):  Left knee arthroscopy, medial meniscus debridement, chondroplasty - Wound Class: I-Clean    Diagnosis: left knee medial meniscus tear  Diagnosis Additional Information: No value filed.    Anesthesia Type:   General, LMA     Note:  Airway :Face Mask  Patient transferred to:PACU        Vitals: (Last set prior to Anesthesia Care Transfer)    CRNA VITALS  4/20/2017 0938 - 4/20/2017 1015      4/20/2017             Pulse: (!)  48    SpO2: 100 %                Electronically Signed By: MAIRA Aguirre CRNA  April 20, 2017  10:15 AM
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
## 2017-04-20 NOTE — DISCHARGE INSTRUCTIONS
[facility]  Same-Day Surgery   Adult Discharge Orders & Instructions   For 24 hours after surgery  1. Get plenty of rest.  A responsible adult must stay with you for at least 24 hours after you leave the hospital.   2. Do not drive or use heavy equipment.  If you have weakness or tingling, don't drive or use heavy equipment until this feeling goes away.  3. Do not drink alcohol.  4. Avoid strenuous or risky activities.  Ask for help when climbing stairs.   5. You may feel lightheaded.  IF so, sit for a few minutes before standing.  Have someone help you get up.   6. If you have nausea (feel sick to your stomach): Drink only clear liquids such as apple juice, ginger ale, broth or 7-Up.  Rest may also help.  Be sure to drink enough fluids.  Move to a regular diet as you feel able.  7. You may have a slight fever. Call the doctor if your fever is over 100 F (37.7 C) (taken under the tongue) or lasts longer than 24 hours.  8. You may have a dry mouth, a sore throat, muscle aches or trouble sleeping.  These should go away after 24 hours.  9. Do not make important or legal decisions.   Call your doctor for any of the followin.  Signs of infection (fever, growing tenderness at the surgery site, a large amount of drainage or bleeding, severe pain, foul-smelling drainage, redness, swelling).    2. It has been over 8 to 10 hours since surgery and you are still not able to urinate (pass water).    3.  Headache for over 24 hours.    4.  Numbness, tingling or weakness the day after surgery (if you had spinal anesthesia).  To contact a doctor call:  [phone]  Name: Geovani Bella MRN #: [id number]  1. Date: 2017  Procedure: left knee arthroscopy, medial meniscus and chondral debridement.  2. Discharge to home when stable, tolerating clear liquids, and patient has urinated  3. Call for follow-up appointment, [phone], with Dr. Mclaughlin in:  2 weeks.   WOUND CARE    The bandage may be  slightly bloody. This is normal.  4. Ice:  Keep an ice bag on your knee for 20 minutes at a time.  5. Keep incisions clean and dry following surgery for:  72 hours   6. Change all bandages in:  72 hours       7. If bandages are changed before follow-up, cover all incisions with fresh bandages or bandaids.  8. O.K. to shower (may get incision wet) in:  72 hours  9. No tub baths, swimming pools, hot tubs, etc. for a minimum of 2 weeks following surgery  ACTIVITY  10. Keep leg elevated on a pillow placed under ankle. Do not keep pillow under your knee.  11. Weight-bearing ([place]):  Weight-bear as tolerated       May discontinue crutches in 2-3 days if able to walk without a limp.  12. Bracing: no brace needed.  13. Range of motion limits: no limit. Work on regaining full range of motion.  14. Exercises:  Perform exercises 3 times a day for a minimum of 25 reps each time (start today or tomorrow):             Quadriceps sets  Calf Pumps Straight leg raises  Heels Slides   15. Start Physical Therapy: upon return to clinic.  16. OK to drive:  Not for 48 hours    When going back to driving, be sure to test braking/acceleration maneuvers in an empty parking lot before entry into any traffic areas.      ABSOLUTELY NO DRIVING WHILE TAKING NARCOTICS!    DISCHARGE MEDICATIONS:   Aspirin 325 mg, 1 tablet, take twice a day for 14 days then stop (to prevent blood clots) (over the counter)  Norco (5/325), 1 to 2 tablets, take every 6 hours as needed for pain, do not exceed 12 tabs/day  Other: stool softeners.    Strong pain medication has been prescribed. Use as directed. Do not combine with alcohol. Be careful as you walk or climb stairs.   DIET:  If no nausea, clear liquids should be taken initially.  Then progress to solid foods when clear liquids are tolerated.   RESPONSE TO SURGERY: It is normal to have pain and swelling in your knee after surgery. It may take 4 weeks or longer for the swelling to go away. It is also common  to notice some bruising around the knee, thigh, and calf as the swelling resolves.  EMERGENCY: Call or return for any fevers (temperature greater than 101.5   or sustained fevers greater than 100.5   that haven t resolved within 3 to 4 days following surgery) or chills, increasing pain, swelling, redness, calf pain, drainage (especially if yellow, green, or foul smelling), excessive bleeding), chest pain, shortness of breath:  Phone #: [phone]; If emergency, go to local ER or dial 911.    Reinaldo Mclaughlin M.D., M.S.  Dept. of Orthopaedic Surgery  [facility]    4/20/2017        KNEE SURGERY - HOME EXERCISE PROGRAM    All exercises to be performed at least 3 times per day.     Quad Sets    Sit with leg extended    Tighten quad muscles in front of leg, trying to  push back of knee downward    Hold exercise for 10 seconds    Rest 10 seconds between reps    Perform 1 set of 20 reps, 3 times a day     Heel Slides     Lie on back with legs straight    Slide heel to buttocks     Return to start position    Repeat with other leg    Perform 1 rep every 4 seconds    Perform 3 sets of 20 reps, 3 times a day    Rest 1 minute between sets     Ankle Pumps     Lie on back with foot elevated on pillow    Move foot up and down, pumping ankle    Perform 3 sets of 20 reps, 3 times a day    Perform 1 rep every 4 seconds    Rest 1 minute between sets     Straight Leg Raise    Lie on back with uninvolved knee bent    Raise straight leg to thigh level of bend leg    Return to starting position    Perform 3 sets of 20 reps, 3 times a day    Perform 1 rep every 4 seconds    Rest 1 minute between sets                  [facility]  Same-Day Surgery   Adult Discharge Orders & Instructions   For 24 hours after surgery  10. Get plenty of rest.  A responsible adult must stay with you for at least 24 hours after you leave the hospital.   11. Do not drive or use heavy equipment.  If you have weakness or  tingling, don't drive or use heavy equipment until this feeling goes away.  12. Do not drink alcohol.  13. Avoid strenuous or risky activities.  Ask for help when climbing stairs.   14. You may feel lightheaded.  IF so, sit for a few minutes before standing.  Have someone help you get up.   15. If you have nausea (feel sick to your stomach): Drink only clear liquids such as apple juice, ginger ale, broth or 7-Up.  Rest may also help.  Be sure to drink enough fluids.  Move to a regular diet as you feel able.  16. You may have a slight fever. Call the doctor if your fever is over 100 F (37.7 C) (taken under the tongue) or lasts longer than 24 hours.  17. You may have a dry mouth, a sore throat, muscle aches or trouble sleeping.  These should go away after 24 hours.  18. Do not make important or legal decisions.   Call your doctor for any of the followin.  Signs of infection (fever, growing tenderness at the surgery site, a large amount of drainage or bleeding, severe pain, foul-smelling drainage, redness, swelling).    2. It has been over 8 to 10 hours since surgery and you are still not able to urinate (pass water).    3.  Headache for over 24 hours.      To contact a doctor call:  [phone]      No Ibuprofen before 4:00 pm.

## 2017-04-20 NOTE — TELEPHONE ENCOUNTER
"Per Marciano,    Unable to complete. \"Patient has no known history of disability or injury that I have seen him for.\"    LMOM for patient. Forms placed at  for pickup.    Yuridia Peralta,   "

## 2017-04-20 NOTE — INTERVAL H&P NOTE
The History and Physical on patient's chart was personally reviewed today with the patient. there have been no interval changes in patient's history since H+P performed.    History:  Geovani Bella is a 36 year old male with  ongoing left knee pain with no known injury. Pain has been present for 2 months or so (per Dr. Gates note 3/10/2017, he notes reported pain for 2-3 years). He states that he was walking into work at [facility] [employer] and at the cement ramp incline, he slipped going forward and stumbled a bit before regaining his footing without falling. He does not recall having immediate pain after this incident but he has no recollection of any other causative events. Today his pain is rated an 8/10. He describes the pain as sharp, aching, and shooting. Patient reports pain with prolonged sitting and prolonged walking. Unable to flex his knee for a long time. Pain with pivoting. Seat to stand causes tightness and pain posteriorly. For treatment he has tried ice and ibuprofen. Mild pain at rest. 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 Previous history bursal sac burst, was lanced.     Patient also reported right knee pain. He has swelling at the back of the knee, which keeps the patient from crouching.     Present symptoms: severe pain. No locking, catching, giving way.  Pain severity: 8/10  Frequency of symptoms: are constant  Exacerbating Factors: prolonged sitting and walking, bending, crouching, pivoting.  Relieving Factors: positional changes  Night Pain: No  Pain while at rest: No   Numbness or tingling: No   Patient has tried:  NSAIDS: No   Physical Therapy: No   Activity modification: No   Bracing: No   Injections: No   Ice: No   Assistive device: No  Other: none      X-RAY:  3 views bilateral knee from 3/10/2017: no obvious fractures or dislocations. Preserved joint spacing.     MRI: MRI left knee from 3/20/2017:   IMPRESSION:   1. Horizontal tear involving the mid body and  posterior horn of the  medial meniscus. There are two associated parameniscal cysts.  2. 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      Impression: 36 year old male with left knee pain, medial meniscus tear and parameniscal cyst, chonromalacia.     Plan:   * discussed injury with patient, what appears to be a medial meniscal tear on MRI, as well as some underlying arthritic changes, which is consistent with symptoms and physical examination findings.   * likely somewhat longstanding given cyst formation, doesn't usually develop that quickly with acute injuries.     * Discussed treatment options including nonoperative treatment with continued rest, ice, elevation, activity modification, NSAIDS and Physical Therapy, bracing and potential injections versus surgical treatment with arthroscopy and meniscal repair versus debridement. Risks and benefits of each discussed in detail.  * in the setting of underlying chondrosis, predictability of arthroscopy is uncertain, unless mechanical symptoms present due to the meniscus tear.     * surgical risks discussed: bleeding, infection, pain, scar, damage to adjacent structures (nerve, vessels, cartilage), stiffness, post-traumatic arthritis, failure to relieve symptoms, recurrence of symptoms, blood clots (DVT), pulmonary emolism, risks of anesthesia and death. This surgery is not intended nor expected to alleviate arthritic pain symptoms, nor will it treat or correct underlying arthritic changes. Arthritis and symptoms related to arthritis could worsen with arthroscopy and meniscal debridement. Patient understands.     * understanding the risks of surgery, patient elected to proceed with arthroscopy  * plan: left knee arthroscopy with partial meniscal debridement versus repair, possible chondral debridement, outpatient surgery    Patient elects to proceed with planned procedure.  left knee arthroscopy with partial meniscal debridement versus  repair, possible chondral debridement, outpatient surgery     Risks and perceived benefits of surgery again discussed with patient. Patient's questions addressed and answered. Written informed consent obtained and reviewed. Surgical site marked with indelible marker with patient's participation after confirming site with patient.      Reinaldo Mclaughlin M.D., M.S.  Dept. of Orthopaedic Surgery  [facility]

## 2017-04-20 NOTE — ANESTHESIA POSTPROCEDURE EVALUATION
Patient: Geovani Bella    Procedure(s):  Left knee arthroscopy, medial meniscus debridement, chondroplasty - Wound Class: I-Clean    Diagnosis:left knee medial meniscus tear  Diagnosis Additional Information: No value filed.    Anesthesia Type:  General, LMA    Note:  Anesthesia Post Evaluation    Patient location during evaluation: PACU  Patient participation: Able to fully participate in evaluation  Level of consciousness: awake  Pain management: adequate  Airway patency: patent  Cardiovascular status: acceptable  Respiratory status: acceptable  Hydration status: balanced  PONV: none     Anesthetic complications: None          Last vitals:  Vitals:    04/20/17 0809 04/20/17 1010   BP: 124/74 139/66   Resp: 20 20   Temp: 97.9  F (36.6  C) 96.8  F (36  C)   SpO2: 98% 96%         Electronically Signed By: See Rollins MD  April 20, 2017  10:24 AM

## 2017-04-20 NOTE — BRIEF OP NOTE
POST OPERATIVE NOTE-IMMEDIATE :    Date of surgery: 4/20/2017    Preoperative Diagnosis:  left knee medial meniscus tear    Postoperative Diagnosis:  left knee medial meniscus tear    Procedures:  Procedure(s):  ARTHROSCOPY KNEE, Left  Medial meniscus debridement    Prosthetic Devices: See Op Note    Surgeon(s) and Assistants (if any):  Attending Surgeon: Reinaldo Mclaughlin MD, MS  Assistant: Mars Holley PA-C    Anesthesia:  General    Antibiotics: 2g Ancef    IV Fluids: 700cc LR    UOP: 0, no peters    Drains: none    Specimens: none    Complications: None apparent.    Tourniquet Time: 21 minutes @ 300mmHg    Findings/Conclusions:  See Op Note for further detail.    Estimated Blood Loss: 1ml    Post Op Plan:  *Rest   *Ice   *Elevation   *Weight bearing as tolerated, crutches as needed   *oral pain medications  *Daily asa x2 weeks  *Home exercise program   *Return to clinic 2 weeks for wound check, suture removal, sooner if needed      Mars Holley PA-C, CAQ (Ortho)  Supervising Physician: Reinaldo Mclaughlin M.D., M.S.  Dept. of Orthopaedic Surgery  [facility]

## 2017-04-20 NOTE — ANESTHESIA PREPROCEDURE EVALUATION
Anesthesia Evaluation     .             ROS/MED HX    ENT/Pulmonary:     (+)sleep apnea, , . .    Neurologic:       Cardiovascular:  - neg cardiovascular ROS       METS/Exercise Tolerance:     Hematologic:         Musculoskeletal:         GI/Hepatic:         Renal/Genitourinary:         Endo:         Psychiatric:         Infectious Disease:         Malignancy:         Other:                     Physical Exam  Normal systems: pulmonary and dental    Airway   Mallampati: II  TM distance: >3 FB  Neck ROM: full    Dental     Cardiovascular   Rhythm and rate: regular and normal      Pulmonary                     Anesthesia Plan      History & Physical Review  History and physical reviewed and following examination; no interval change.    ASA Status:  2 .    NPO Status:  > 8 hours    Plan for General and LMA with Intravenous and Propofol induction. Maintenance will be TIVA.    PONV prophylaxis:  Ondansetron (or other 5HT-3) and Dexamethasone or Solumedrol       Postoperative Care  Postoperative pain management:  Multi-modal analgesia.      Consents                          .

## 2017-04-20 NOTE — IP AVS SNAPSHOT
[facility]    [address] [place] [address]    Phone:  [phone]                                       After Visit Summary   4/20/2017    Geovani Bella    MRN: [id number]           After Visit Summary Signature Page     I have received my discharge instructions, and my questions have been answered. I have discussed any challenges I see with this plan with the nurse or doctor.    ..........................................................................................................................................  Patient/Patient Representative Signature      ..........................................................................................................................................  Patient Representative Print Name and Relationship to Patient    ..................................................               ................................................  Date                                            Time    ..........................................................................................................................................  Reviewed by Signature/Title    ...................................................              ..............................................  Date                                                            Time

## 2017-04-20 NOTE — OP NOTE
DATE OF SERVICE:  04/20/2017.      PREOPERATIVE DIAGNOSES:  Left knee medial meniscus tear, medial and patellofemoral chondrosis and parameniscal cyst.      POSTOPERATIVE DIAGNOSES:  Left knee medial meniscus tear, medial and patellofemoral chondrosis and parameniscal cyst.      PROCEDURES:   1.  Left knee arthroscopic partial medial meniscectomy.   2.  Left knee shaving chondroplasty, patella and medial femoral condyle.      ANESTHESIA:  General in the supine position.      INTRAVENOUS FLUIDS:  700 mL lactated Ringer's.      URINE OUTPUT:  0, no Cabral.      ESTIMATED BLOOD LOSS:  1 mL.      ANTIBIOTICS:  Cefazolin 2 grams IV prior to incision and tourniquet  inflation.      TOURNIQUET TIME:  21 minutes at 300 mmHg of the left upper thigh.      COMPLICATIONS:  None apparent.      IMPLANTS:  None.      DRAINS:  None.      SPECIMENS:  None.      FINDINGS:  Minimal synovitis.  No effusion.  Grade 2 and 3 chondrosis of the lateral patellar facet.  Grade 1 chondrosis femoral trochlea.  Intact ACL.  Lateral compartment with intact lateral meniscus.  Grade 1 chondrosis.  Medial compartment with horizontal cleavage tear of the posterior horn and body of the medial meniscus.  Grade 2 and 3 chondrosis of the medial femoral condyle.      INDICATIONS FOR PROCEDURE:  Geovani Bella is a 36-year-old gentleman with ongoing left knee pain with no known specific injury.  Pain has been reported for probably about 2-3 years.  May have had a more recent episode and worsening in the past couple months.  He thinks that could be following a stumble.  Tripped, but did not actually fall.  Did not have immediate pain after the incident, however.  Pain is sharp, aching, shooting with prolonged sitting, prolonged walking, pivoting, twisting.  He has iced and ibuprofen.  He has had previous right knee injuries from wrestling.  Seen in Sports Medicine, referred for surgical evaluation after an MRI showed a medial meniscus tear.  We discussed  what appears to be medial meniscus tear on MRI, as well as some underlying chondromalacia, arthritic changes consistent with his symptoms and physical examination findings.  There were also some parameniscal cyst formations medially, indicating that this is not an acute type problem.  We discussed treatment options, both surgical and nonsurgical.  Surgical risks discussed included, but not limited to, bleeding, infection, pain, scar, damage to adjacent structures including nerves, vessels, cartilage, stiffness, arthritis, failure to relieve symptoms, recurrence of symptoms, worsening of symptoms, blood clots, pulmonary embolism, risks of anesthesia and death.  Surgery is not intended nor expected to alleviate arthritic pain symptoms, nor will it treat or correct underlying arthritic changes.  Arthritis and symptoms related to arthritis could worsen with arthroscopy and meniscal and chondral debridement.  Despite these risks, as a quality of life decision, he elected to proceed.      DETAILS OF PROCEDURE:  The patient was identified in the preoperative holding area.  After confirming with the patient correct procedure and procedural site, left knee was marked with an indelible marker by myself, the attending surgeon.  After again reviewing risks and perceived benefits of surgery, questions were addressed.  He elected to proceed.  Written informed consent was obtained and reviewed.      Patient was then taken to the operating room and placed supine on the operating table.  All bony prominences well padded and he was adequately secured.      Adequate general anesthesia, a nonsterile tourniquet was placed to the left upper thigh.  Left lower extremity prepped and draped in the usual sterile fashion.      Timeout was then performed, confirming the correct patient, procedure, procedure site, availability of instruments and implants, review of the patient's allergies as well as the administration of prophylactic  antibiotics by operating staff.      At that time, standard anterolateral arthroscopy portal was made.  It did appear that he had previous arthroscopy on that side, although patient denies it.  We did go through that previous scar.  Upon entering the suprapatellar pouch, there was no obvious effusion or significant synovitis.  Patellofemoral changes as noted.  No loose bodies.  No medial plica.  Upon entering the notch, ACL was grossly intact.  Knee was then placed in a figure-4 position, evaluating the lateral compartment.  Probing superior and inferior surfaces of the lateral meniscus revealed no obvious tear.  Mild lateral tibial chondrosis.      Knee was then placed into valgus stress.  I could see some chondral changes of the medial femoral condyle as noted.  I could see tearing of the posterior horn and body.  Using a probe, this appeared to be a complex horizontal cleavage component.  Alternating between an oscillating debrider and meniscal biter, this was debrided back until this was stable, confirmed with probing.  At least 50% of the posterior horn and body was resected. It did appear that there was fluid leakage from the cysts into the joint. I also percutaneously used a spinal needle to penetrate the areas of the cyst.  I then used the oscillating debrider and performed a gentle shaving chondroplasty, medial femoral condyle, then proceeded back up to the patellofemoral joint and gently debrided the patellar chondrosis.  Remaining fluid was lavaged and drained from the knee.  Instruments were removed.  Wounds were closed with 3-0 Prolene, injected with 0.25% Marcaine.  Steri-Strips, well-padded soft dressing, Ace wrap.  Tourniquet deflated.      Patient was awakened and taken to recovery in stable condition.      PLAN:  Postoperatively, rest, ice, elevate, weightbear as tolerated.  Home exercise program.  Oral pain medications include hydrocodone.  Twice daily aspirin for 2 weeks for blood thinning.   Stool softeners.  We will see him back in 2 weeks' time for wound check, suture removal, sooner if needed.      COMPLICATIONS:  None apparent.         TAINA MOE MD             D: 2017 10:06   T: 2017 10:33   MT: TS      Name:     WILLI MONTANO   MRN:      [id number]        Account:        [id number]   :      1980           Procedure Date: 2017      Document: [id number]

## 2017-04-24 ENCOUNTER — TELEPHONE (OUTPATIENT)
Dept: ORTHOPEDICS | Facility: CLINIC | Age: 37
End: 2017-04-24

## 2017-04-24 NOTE — TELEPHONE ENCOUNTER
Pt DERRICKM, stated he had surgery on 4/20, would like to speak to someone from Dr. Mclaughlin's staff at earliest convenience.

## 2017-04-25 NOTE — TELEPHONE ENCOUNTER
I left a VM for Shaheed letting him know I was at the hospital and it will be difficult to reach me but I will try to check messages between cases and hopefully we can connect soon.    Mars Holley PA-C, DUANE (Ortho)  Supervising Physician: Reinaldo Mclaughlin M.D., M.S.  Dept. of Orthopaedic Surgery  [facility]

## 2017-04-25 NOTE — TELEPHONE ENCOUNTER
PT CALLING BACK TO TRY TO CONNECT  Question is   Did you fax in a sheet to his unemployment?  That is his question and all else is well  You can leave that info on his voicemail    [phone]    Thank you

## 2017-04-26 NOTE — TELEPHONE ENCOUNTER
I connected with Shaheed and i recalled some forms being completed on 4/21/17 that were brought to [facility] and returned to the deliverer. Apparently this has been misplaced. He is going to resend the forms via fax and I will complete once received.    Mars Holley PA-C, CARAMAKRISHNA (Ortho)  Supervising Physician: Reinaldo Mclaughlin M.D., M.S.  Dept. of Orthopaedic Surgery  [facility]

## 2017-04-26 NOTE — TELEPHONE ENCOUNTER
I left a Vm for Shaheed letting him know that I am not sure if I completed his forms, but that if I received them then I completed them and faxed them in. I asked that he check with his employer to see if they were received otherwise I left our fax number that he could have them sent to.    Mars Holley PA-C, CARAMAKRISHNA (Ortho)  Supervising Physician: Reinaldo Mclaughlin M.D., M.S.  Dept. of Orthopaedic Surgery  [facility]

## 2017-04-28 ENCOUNTER — TELEPHONE (OUTPATIENT)
Dept: ORTHOPEDICS | Facility: CLINIC | Age: 37
End: 2017-04-28

## 2017-04-28 ENCOUNTER — RADIANT APPOINTMENT (OUTPATIENT)
Dept: ULTRASOUND IMAGING | Facility: CLINIC | Age: 37
End: 2017-04-28
Attending: ORTHOPAEDIC SURGERY
Payer: COMMERCIAL

## 2017-04-28 DIAGNOSIS — M79.662 PAIN OF LEFT CALF: ICD-10-CM

## 2017-04-28 DIAGNOSIS — S83.232D COMPLEX TEAR OF MEDIAL MENISCUS OF LEFT KNEE AS CURRENT INJURY, SUBSEQUENT ENCOUNTER: ICD-10-CM

## 2017-04-28 DIAGNOSIS — M79.662 PAIN OF LEFT CALF: Primary | ICD-10-CM

## 2017-04-28 PROCEDURE — 93971 EXTREMITY STUDY: CPT | Mod: LT

## 2017-04-28 RX ORDER — HYDROCODONE BITARTRATE AND ACETAMINOPHEN 5; 325 MG/1; MG/1
1-2 TABLET ORAL EVERY 8 HOURS PRN
Qty: 30 TABLET | Refills: 0 | Status: SHIPPED | OUTPATIENT
Start: 2017-04-28 | End: 2017-05-10

## 2017-04-28 NOTE — TELEPHONE ENCOUNTER
Reason for call:  Patient reporting a symptom    Symptom or request: Ankle and Calf Swelling ( painful) - patient also states there is some swelling behind the right knee also golf size swelling behind the left knee, the patient had surgery dated 04/20/17 and would like to be advised of his next steps,     Duration (how long have symptoms been present): a couple of days    Have you been treated for this before? No    Additional comments: please call this morning,     Phone Number patient can be reached at:  Home number on file [phone] (home)    Best Time:  This morning    Can we leave a detailed message on this number:  YES    Call taken on 4/28/2017 at 8:33 AM by Hiro Nicholson

## 2017-04-28 NOTE — TELEPHONE ENCOUNTER
Called and left a vm for the patient letting him know that his prescription was ready for  at the [facility] . He would have to be the one picking it up because I was not able to speak to him. I informed him to bring a picture ID. Also, I let him know that I put an order in for a DVT scan and left the [facility] number for him to call and schedule. I left our number for further questions and let him know we would be out of the clinic at 11:30 today. Norco 5/325mg, #30, zero refills, 1-2 tabs every 8 hours as needed.  Francia Jacob Certified Medical Assistant  Patient called back and spoke to him regarding medication and DVT scan. He will be in to  the prescription and call for a DVT scan.   Francia Jacob Certified Medical Assistant

## 2017-04-28 NOTE — TELEPHONE ENCOUNTER
Geovani Bella is a 36 year old male who calls with swelling and golf ball sized lump behind left knee.  Patient states that that lower calf is extremely tender to touch.  Patient states that he is up a little bit and no increased pain with bearing weight.  Patient states that he has been elevating and icing.  Patient is currently taking Norco about 6 times a day.     NURSING ASSESSMENT:  Description:  See above   Onset/duration:  Couple days   Precip. factors:  Post-op 8 days   Associated symptoms:  Denies fever, chills, nausea and vomiting   Improves/worsens symptoms:  NA  Pain scale (0-10)   4/10  Last exam/Treatment:  4/20/2017  Allergies: No Known Allergies    MEDICATIONS:   Taking medication(s) as prescribed? Yes  Taking over the counter medication(s?) No  Any medication side effects? No significant side effects    Any barriers to taking medication(s) as prescribed?  No  Medication(s) improving/managing symptoms?  N/A  Medication reconciliation completed: Yes      NURSING PLAN: Huddle with provider, plan includes continue with home care instructions.     RECOMMENDED DISPOSITION:  Home care advice - elevate your extremity above your heart, continue to ice 15 minutes on 15 minutes off, add acetaminiphen to medications not to take more than 4000 mg in a 24 hour period.  Patient to limit salty foods, increase fluids and ambulate as tolerated.  Education provided about DVT and advised to be seen in ED if symptoms do not improve.   Will comply with recommendation: Yes  If further questions/concerns or if symptoms do not improve, worsen or new symptoms develop, call your PCP or [facility] as soon as possible.      Guideline used:  Telephone Triage Protocols for Nurses, Fourth Edition, Snoal Li RN

## 2017-05-03 NOTE — PROGRESS NOTES
Sports Medicine Clinic Visit    PCP: Keo Phillips    Geovani Bella is a 36 year old male who is seen  as a self referral presenting with bilateral knee pain.  Left medial knee pain and right posterior knee pain.  Left knee has been painful for about 2-3 years, right knee has been increasing in pain over the past 2-3 weeks.  No known injury.  No formal treatment.   Pain with squatting on right.      Injury: gradual onset. Different issues each knee.    LEFT knee:  Focal medial joint line pain. Thinks was old wrestling injury, describes twisting. Feels like getting worse over time. Notes that with walking around on firm surfaces for as little as 5 mins gets increased pain, limping. Pain not as bad seated in clinic currently. + nighttime pain. Maybe some swelling present.  Can get crack/pop sensation at knee.    RIGHT knee:  More recent, is in popliteal space. Unable to squat due to pain, pressure.  No specific injury.  No noted swelling.      Location of Pain: bilateral knee, left medial and right posterior  Duration of Pain: 2-3 year(s)  Rating of Pain at worst: 9/10  Rating of Pain Currently: 4/10  Symptoms are better with: Ice, Rest, Sitting and compression (left) standing (right)  Symptoms are worse with: squatting, stairs, walking on cement  Additional Features:   Positive: swelling, popping, grinding, catching and instability   Negative: bruising, locking, paresthesias, numbness, weakness, pain in other joints and systemic symptoms  Other evaluation and/or treatments so far consists of: Nothing  Prior History of related problems: strains in high school    Social History: phlebotomist     Review of Systems  Musculoskeletal: as above  Remainder of review of systems is negative including constitutional, CV, pulmonary, GI, Skin and Neurologic except as noted in HPI or medical history.    Past Medical History   Diagnosis Date     Genital warts 6/13/2011     Muscle tension headache 2/22/2011     Opiate  "dependence (H) 12/4/2009     In remission     Tobacco use disorder, continuous 12/4/2009     No past surgical history on file.  Family History   Problem Relation Age of Onset     CANCER Mother      SKIN     Cardiovascular Father      HEART DISEASE Father      CANCER Father      MANTLE CELL, SKIN     Genitourinary Problems Father      Lipids Father      Alcohol/Drug Maternal Grandmother      Alcohol/Drug Maternal Grandfather      HEART DISEASE Maternal Grandfather      Breast Cancer Paternal Grandmother      Alcohol/Drug Paternal Grandmother      Alcohol/Drug Paternal Grandfather      HEART DISEASE Paternal Grandfather      Social History     Social History     Marital status: Single     Spouse name: N/A     Number of children: N/A     Years of education: N/A     Occupational History     Not on file.     Social History Main Topics     Smoking status: Current Some Day Smoker     Packs/day: 0.25     Years: 15.00     Types: Cigars     Smokeless tobacco: Never Used      Comment: cigars     Alcohol use No     Drug use: No     Sexual activity: Yes     Other Topics Concern     Parent/Sibling W/ Cabg, Mi Or Angioplasty Before 65f 55m? No     Social History Narrative       Objective  /84  Ht 5' 8.5\" (1.74 m)  Wt 233 lb (105.7 kg)  BMI 34.91 kg/m2    GENERAL APPEARANCE: healthy, alert and no distress   GAIT: antalgic  SKIN: no suspicious lesions or rashes  NEURO: Normal strength and tone, mentation intact and speech normal  PSYCH:  mentation appears normal and affect normal/bright  HEENT: no scleral icterus  CV: no extremity edema  RESP: nonlabored breathing    Bilateral Knee exam    ROM:        Full active and passive ROM with flexion and extension left  Pain right with end of flexion; motion 0-100    Inspection:       no visible ecchymosis        effusion noted mild left       Fullness right popliteal space, no clear effusion right    Skin:       no visible deformities       well perfused       capillary refill " brisk    Patellar Motion:        Normal patellar tracking noted through range of motion    Tender:        medial joint line left       Right distal medial and lateral hamstring tendons, mild; also mild proximal gastroc right       Popliteal space right, mild    Non Tender:         remainder of knee area    Special Tests:        Mild pain left with Gil       neg (-) Lachman       neg (-) anterior drawer       neg (-) posterior drawer       neg (-) varus       neg (-) valgus       Mild pain bilat with pain with forced extension      Radiology  Visualized radiographs of bilateral knee obtained today, and reviewed the images with the patient.  Impression: Bilateral Thompson, bilateral axial, and bilateral lateral views of the knees demonstrate no acute osseous abnormality. Joint spaces appear preserved.      Assessment:  1. Pain in both knees, unspecified chronicity    2. Left knee pain, unspecified chronicity    left consider internal derangement, such as medial meniscus pathology  On right suspicious for popliteal cyst, but no clear effusion and no other significant exam findigns    Plan:  Discussed the assessment with the patient. We discussed the following treatment options: symptom treatment, activity modification/rest, imaging, rehab, injection therapy and support for the affected area. Following discussion, plan:  Topical Treatments: Ice  Over the counter medication: Patient's preferred OTC medication as directed on packaging.  MRI of the left knee next, given assessment  Activity Modification: discussed  Letter provided, here today  Rehab: consideration pending MRI for left knee   For now, for right knee, will do some stretching of hamstring and calf, possible compensation on right for ongoing left knee issues.  Medical Equipment: consider compression for comfort if desired  May consider steroid injection for knee (left) pending MRI  Follow up: call with MRI results.  Questions answered. The patient  indicates understanding of these issues and agrees with the plan.    Abraham Gates, DO, CAQ            Disclaimer: This note consists of symbols derived from keyboarding, dictation and/or voice recognition software. As a result, there may be errors in the script that have gone undetected. Please consider this when interpreting information found in this chart.       (0) independent

## 2017-05-04 ENCOUNTER — OFFICE VISIT (OUTPATIENT)
Dept: ORTHOPEDICS | Facility: CLINIC | Age: 37
End: 2017-05-04
Payer: COMMERCIAL

## 2017-05-04 VITALS — BODY MASS INDEX: 32.61 KG/M2 | RESPIRATION RATE: 16 BRPM | WEIGHT: 220.2 LBS | HEIGHT: 69 IN

## 2017-05-04 DIAGNOSIS — Z98.890 S/P LEFT KNEE ARTHROSCOPY: Primary | ICD-10-CM

## 2017-05-04 PROCEDURE — 99024 POSTOP FOLLOW-UP VISIT: CPT | Performed by: ORTHOPAEDIC SURGERY

## 2017-05-04 ASSESSMENT — PAIN SCALES - GENERAL: PAINLEVEL: SEVERE PAIN (6)

## 2017-05-04 NOTE — NURSING NOTE
"Chief Complaint   Patient presents with     Surgical Followup     Left knee scope - partial MM. DOS 4/20/17, 2 week PO. Patient states his knee is doing well. He has a lot of swelling in his calf and ankle which causes pain. He has been icing and elevating. He has been semi active. He continues to take the pain medication, taking it as prescribed.        Initial Resp 16  Ht 1.74 m (5' 8.5\")  Wt 99.9 kg (220 lb 3.2 oz)  BMI 32.99 kg/m2 Estimated body mass index is 32.99 kg/(m^2) as calculated from the following:    Height as of this encounter: 1.74 m (5' 8.5\").    Weight as of this encounter: 99.9 kg (220 lb 3.2 oz).  Medication Reconciliation: moisés Jacob Certified Medical Assistant    "

## 2017-05-04 NOTE — PROGRESS NOTES
Chief Complaint   Patient presents with     Surgical Followup     Left knee scope - partial MM. DOS 4/20/17, 2 week PO. Patient states his knee is doing well. He has a lot of swelling in his calf and ankle which causes pain. He has been icing and elevating. He has been semi active. He continues to take the pain medication, taking it as prescribed.        SURGERY: left knee arthroscopy . ( [facility] )  1. Left knee arthroscopic partial medial meniscectomy.   2. Left knee shaving chondroplasty, patella and medial femoral condyle.     DATE OF SURGERY: 4/20/2017.      HISTORY OF PRESENT ILLNESS:  Geovani Bella is a 36 year old male seen for postoperative evaluation of a left knee arthroscopy and partial medial meniscectomy and shaving chondroplasty for left knee medial meniscus tear, medial and patellofemoral chondrosis and parameniscal cyst. Surgery occurred 2 weeks ago. Returns today stating his knee is doing well. Reports a lot of swelling in calf and ankle. Pain has been tolerable, rated 6/10. He has been icing and elevating. Has been semi- active. Continues to take pain medication as prescribed. No problems with the surgical wounds. Denies fevers chills or night sweats. No associated numbness or tingling. Has been non compliant with weightbearing restrictions since surgery as recommended. Taking aspirin daily. He had a DVT scan postoperative due to swelling which was negative.    Patient would like unemployment papers to be filled out today. Patient lost his job on March 24, 2017. Since that time, he has not been employed.        OR FINDINGS: Minimal synovitis. No effusion. Grade 2 and 3 chondrosis of the lateral patellar facet. Grade 1 chondrosis femoral trochlea. Intact ACL. Lateral compartment with intact lateral meniscus. Grade 1 chondrosis. Medial compartment with horizontal cleavage tear of the posterior horn and body of the medial meniscus. Grade 2 and 3 chondrosis of the medial  femoral condyle.     Past Medical History:   Diagnosis Date     Genital warts 6/13/2011     Muscle tension headache 2/22/2011     Opiate dependence (H) 12/4/2009    In remission     Tobacco use disorder, continuous 12/4/2009       Past Surgical History:   Procedure Laterality Date     ARTHROSCOPY KNEE Left 4/20/2017    Procedure: ARTHROSCOPY KNEE;  Left knee arthroscopy, medial meniscus debridement, chondroplasty;  Surgeon: Reinaldo Mclaughlin MD;  Location:  OR     ORTHOPEDIC SURGERY  2001    Carpal tunnel        Medications:   Current Outpatient Prescriptions:      HYDROcodone-acetaminophen (NORCO) 5-325 MG per tablet, Take 1-2 tablets by mouth every 8 hours as needed for other (Moderate to Severe Pain) Maximum: 6 tablets per day., Disp: 30 tablet, Rfl: 0     senna-docusate (SENOKOT-S;PERICOLACE) 8.6-50 MG per tablet, Take 1-2 tablets by mouth 2 times daily Take while on oral narcotics to prevent or treat constipation., Disp: 30 tablet, Rfl: 0     aspirin  MG EC tablet, Take 1 tablet (325 mg) by mouth 2 times daily (with meals) for 14 days, Disp: 28 tablet, Rfl: 0     busPIRone (BUSPAR) 15 MG tablet, Take 1 tablet (15 mg) by mouth 2 times daily, Disp: 180 tablet, Rfl: 3     albuterol (PROAIR HFA, PROVENTIL HFA, VENTOLIN HFA) 108 (90 BASE) MCG/ACT inhaler, Inhale 2 puffs into the lungs every 6 hours as needed for shortness of breath / dyspnea, Disp: 1 Inhaler, Rfl: 0     methylphenidate (CONCERTA) 27 MG CR tablet, 27 mg, Disp: , Rfl:      sildenafil (VIAGRA) 100 MG tablet, Take 100 mg by mouth daily as needed., Disp: , Rfl:      CLONAZEPAM PO, Take  by mouth 2 times daily., Disp: , Rfl:     Allergies: No Known Allergies     This document serves as a record of the services and decisions personally performed and made by Reinaldo Mclaughlin MD. It was created on his behalf by Trena Culver, a trained medical scribe. The creation of this document is based the provider's statements to the medical scribe.    Omi Albrecht  "Culver 1:34 PM 5/4/2017     REVIEW OF SYSTEMS:   CONSTITUTIONAL:NEGATIVE for fever, chills, night sweats  INTEGUMENTARY/SKIN: NEGATIVE for worrisome wound problems or redness.  MUSCULOSKELETAL:See HPI above  NEURO: NEGATIVE for weakness, dizziness or paresthesias    PHYSICAL EXAM:  Resp 16  Ht 1.74 m (5' 8.5\")  Wt 99.9 kg (220 lb 3.2 oz)  BMI 32.99 kg/m2   GENERAL APPEARANCE: healthy, alert, no distress  SKIN: no suspicious lesions or rashes  NEURO: Normal strength and tone, mentation intact and speech normal  PSYCH:  mentation appears normal and affect normal, not anxious  RESPIRATORY: No increased work of breathing.    left  LOWER EXTREMITY:  Gait: quadriceps favoring left  No Quad atrophy, strength weak  Intact sensation deep peroneal nerve, superficial peroneal nerve, med/lat tibial nerve, sural nerve, saphenous nerve  Intact EHL, EDL, TA, FHL, GS, quadriceps hamstrings and hip flexors  Toes warm and well perfused, brisk capillary refill. Palpable 2+ dp pulses.  calf soft and nttp or squeeze.  Edema: 1+    left  KNEE EXAM:    Skin: intact, mild ecchymosis, no erythema; small scab medially from bandage  Incisions: skin edges well approximated, sutures in place. Dry. No erythema.  ROM: 0 extension to 115 flexion  Effusion: small  Tender: mild diffuse.        X-RAY: none indicated.      Impression: Geovani Bella is a 36 year old male 2 weeks status post left knee arthroscopy and  partial medial meniscectomy and shaving chondroplasty , doing well.       Plan: routine postoperative knee arthroscopy  * suture removal  * reviewed arthroscopy photos.    * WB status: Cautioned not to overdo it too quickly. Increased activities too soon will lead to more swelling of the knee and leg, more pain, slower recovery. Expect 6-8 weeks. I suspect that he is up more than recommended, and needs more rest and elevation \"toes above the nose\"    * Rest  * Activity modification - avoid activities that aggravate symptoms.  * " NSAIDS - regular use for inflammation, with food, as long as no contra-indications. Please discuss with pcp if needed.  * Ice twice daily to three times daily as needed.  * Compression wrap as needed.  * Elevation of extremity to reduce swelling as needed.  * PT ordered for strengthening, stretching and range of motion exercises, effusion control.  * Tylenol as needed for pain  * Unemployment papers filled out today, per patient's request. Copied for our records.  * Workability update: able to return to work with light duty restrictions. Sedentary duties with intermittent standing and walking.  * return to clinic 4 weeks, sooner as needed.        The information in this document, created by a scribe for me, accurately reflects the services I personally performed and the decisions made by me. I have reviewed and approved this document for accuracy.        Reinaldo Mclaughlin M.D., M.S.  Dept. of Orthopaedic Surgery  [facility]

## 2017-05-04 NOTE — PATIENT INSTRUCTIONS
Please remember to call and schedule a follow up appointment if one was recommended at your earliest convenience.  Orthopedics CLINIC HOURS TELEPHONE NUMBER   Dr. Arnoldo Feldman  Certified Medical Assistant   Monday & Wednesday   8am - 5pm  Thursday 1pm - 5pm  Friday 8am -11:30am Specialty schedulers:   [phone] to schedule your surgery.  Main Clinic:   [phone] to make an appointment with any provider.    Urgent Care locations:    [facility] Monday-Friday Closed  Saturday-Sunday 9am-5pm      Monday-Friday 12pm - 8pm  Saturday-Sunday 9am-5pm [phone]     If SURGERY has been recommended, please call our Specialty Schedulers at [phone] to schedule your procedure.    If you need a medication refill, please contact your pharmacy. Please allow 3 business days for your refill to be completed.    If an MRI or CT scan has been recommended, please call [facility] Schedulers at [phone] to schedule your appointment.  Use [employer] (secure e-mail communication and access to your chart) to send a message or to make an appointment. Please ask how you can sign up for [employer].  Your care team's suggested websites for health information:   Www.[email or web address] : Up to date and easily searchable information on multiple topics.   [email or web address] : [place] dept of heat, public health issues in [place], N1N1

## 2017-05-04 NOTE — MR AVS SNAPSHOT
After Visit Summary   5/4/2017    Geovani Bella    MRN: [id number]           Patient Information     Date Of Birth          1980        Visit Information        Provider Department      5/4/2017 1:15 PM Reinaldo Mclaughlin MD [facility]        Today's Diagnoses     S/P left knee arthroscopy    -  1      Care Instructions    Please remember to call and schedule a follow up appointment if one was recommended at your earliest convenience.  Orthopedics CLINIC HOURS TELEPHONE NUMBER   Dr. Arnoldo Feldman  Certified Medical Assistant   Monday & Wednesday   8am - 5pm  Thursday 1pm - 5pm  Friday 8am -11:30am Specialty schedulers:   [phone] to schedule your surgery.  Main Clinic:   [phone] to make an appointment with any provider.    Urgent Care locations:    [facility] Monday-Friday Closed  Saturday-Sunday 9am-5pm      Monday-Friday 12pm - 8pm  Saturday-Sunday 9am-5pm [phone]     If SURGERY has been recommended, please call our Specialty Schedulers at [phone] to schedule your procedure.    If you need a medication refill, please contact your pharmacy. Please allow 3 business days for your refill to be completed.    If an MRI or CT scan has been recommended, please call [facility] Schedulers at [phone] to schedule your appointment.  Use [employer] (secure e-mail communication and access to your chart) to send a message or to make an appointment. Please ask how you can sign up for [employer].  Your care team's suggested websites for health information:   Www.[email or web address] : Up to date and easily searchable information on multiple topics.   [email or web address] : [place] dept of heat, public health issues in [place], N1N1            Follow-ups after your visit        Additional Services     PING PT, HAND, AND CHIROPRACTIC REFERRAL       **This order will print in the PING Scheduling  Office**    Physical Therapy, Hand Therapy and Chiropractic Care are available through:    *[facility]  *[facility]  *[facility]    Call one number to schedule at any of the above locations: [phone].    Your provider has referred you to: Physical Therapy at [facility] or [facility]    Indication/Reason for Referral: Knee Pain  Onset of Illness: left knee arthroscopy on 4/20/17  Therapy Orders: Evaluate and Treat  Special Programs: None  Special Request: None    Oscar Moore      Additional Comments for the Therapist or Chiropractor:     Please be aware that coverage of these services is subject to the terms and limitations of your health insurance plan.  Call member services at your health plan with any benefit or coverage questions.      Please bring the following to your appointment:    *Your personal calendar for scheduling future appointments  *Comfortable clothing                  Follow-up notes from your care team     Return if symptoms worsen or fail to improve.      Your next 10 appointments already scheduled     May 24, 2017 10:30 AM CDT   New Visit with Fco [facility] ([address] [address] [phone]            May 31, 2017  9:30 AM CDT   Return Visit with Fco Jackson [address] [address] [phone]              Who to contact     If you have questions or need follow up information about today's clinic visit or your schedule please contact [facility] directly at [phone].  Normal or non-critical lab and imaging results will be communicated to you by MyChart, letter or phone within 4 business days after the clinic has received the results. If you do not hear from us within 7 days, please contact  "the clinic through [employer] or phone. If you have a critical or abnormal lab result, we will notify you by phone as soon as possible.  Submit refill requests through [employer] or call your pharmacy and they will forward the refill request to us. Please allow 3 business days for your refill to be completed.          Additional Information About Your Visit        [employer] Information     [employer] gives you secure access to your electronic health record. If you see a primary care provider, you can also send messages to your care team and make appointments. If you have questions, please call your primary care clinic.  If you do not have a primary care provider, please call [phone] and they will assist you.        Care EveryWhere ID     This is your Care EveryWhere ID. This could be used by other organizations to access your [facility] medical records  [id number]        Your Vitals Were     Respirations Height BMI (Body Mass Index)             16 5' 8.5\" (1.74 m) 32.99 kg/m2          Blood Pressure from Last 3 Encounters:   04/20/17 140/80   04/13/17 122/74   03/27/17 118/80    Weight from Last 3 Encounters:   05/04/17 220 lb 3.2 oz (99.9 kg)   04/13/17 220 lb (99.8 kg)   04/06/17 220 lb (99.8 kg)              We Performed the Following     PING PT, HAND, AND CHIROPRACTIC REFERRAL        Primary Care Provider Office Phone # Fax #    Keo Phillips PA-C [phone]       [address] [address]        Thank you!     Thank you for choosing [facility]  for your care. Our goal is always to provide you with excellent care. Hearing back from our patients is one way we can continue to improve our services. Please take a few minutes to complete the written survey that you may receive in the mail after your visit with us. Thank you!             Your Updated Medication List - Protect others around you: Learn how to safely use, store " and throw away your medicines at www.disposemymeds.org.          This list is accurate as of: 5/4/17  1:49 PM.  Always use your most recent med list.                   Brand Name Dispense Instructions for use    albuterol 108 (90 BASE) MCG/ACT Inhaler    PROAIR HFA/PROVENTIL HFA/VENTOLIN HFA    1 Inhaler    Inhale 2 puffs into the lungs every 6 hours as needed for shortness of breath / dyspnea       aspirin  MG EC tablet     28 tablet    Take 1 tablet (325 mg) by mouth 2 times daily (with meals) for 14 days       busPIRone 15 MG tablet    BUSPAR    180 tablet    Take 1 tablet (15 mg) by mouth 2 times daily       CLONAZEPAM PO      Take  by mouth 2 times daily.       HYDROcodone-acetaminophen 5-325 MG per tablet    NORCO    30 tablet    Take 1-2 tablets by mouth every 8 hours as needed for other (Moderate to Severe Pain) Maximum: 6 tablets per day.       methylphenidate ER 27 MG CR tablet    CONCERTA     27 mg       senna-docusate 8.6-50 MG per tablet    SENOKOT-S;PERICOLACE    30 tablet    Take 1-2 tablets by mouth 2 times daily Take while on oral narcotics to prevent or treat constipation.       VIAGRA 100 MG cap/tab   Generic drug:  sildenafil      Take 100 mg by mouth daily as needed.

## 2017-05-04 NOTE — Clinical Note
5/4/2017       RE: Geovani Bella  [address]  [place] [address]           Dear Colleague,    Thank you for referring your patient, Geovani Bella, to the [facility] [place]. Please see a copy of my visit note below.    Chief Complaint   Patient presents with     Surgical Followup     Left knee scope - partial MM. DOS 4/20/17, 2 week PO. Patient states his knee is doing well. He has a lot of swelling in his calf and ankle which causes pain. He has been icing and elevating. He has been semi active. He continues to take the pain medication, taking it as prescribed.        SURGERY: left knee arthroscopy . ( [facility] )  1. Left knee arthroscopic partial medial meniscectomy.   2. Left knee shaving chondroplasty, patella and medial femoral condyle.     DATE OF SURGERY: 4/20/2017.      HISTORY OF PRESENT ILLNESS:  Geovani Bella is a 36 year old male seen for postoperative evaluation of a left knee arthroscopy and partial medial meniscectomy and shaving chondroplasty for left knee medial meniscus tear, medial and patellofemoral chondrosis and parameniscal cyst. Surgery occurred 2 weeks ago. Returns today stating his knee is doing well. Reports a lot of swelling in calf and ankle. Pain has been tolerable, rated 6/10. He has been icing and elevating. Has been semi- active. Continues to take pain medication as prescribed. No problems with the surgical wounds. Denies fevers chills or night sweats. No associated numbness or tingling. Has been non compliant with weightbearing restrictions since surgery as recommended. Taking aspirin daily. He had a DVT scan postoperative due to swelling which was negative.    Patient would like unemployment papers to be filled out today. Patient lost his job on March 24, 2017. Since that time, he has not been employed.        OR FINDINGS: Minimal synovitis. No effusion. Grade 2 and 3 chondrosis of the lateral patellar facet. Grade  1 chondrosis femoral trochlea. Intact ACL. Lateral compartment with intact lateral meniscus. Grade 1 chondrosis. Medial compartment with horizontal cleavage tear of the posterior horn and body of the medial meniscus. Grade 2 and 3 chondrosis of the medial femoral condyle.     Past Medical History:   Diagnosis Date     Genital warts 6/13/2011     Muscle tension headache 2/22/2011     Opiate dependence (H) 12/4/2009    In remission     Tobacco use disorder, continuous 12/4/2009       Past Surgical History:   Procedure Laterality Date     ARTHROSCOPY KNEE Left 4/20/2017    Procedure: ARTHROSCOPY KNEE;  Left knee arthroscopy, medial meniscus debridement, chondroplasty;  Surgeon: Reinaldo Mclaughlin MD;  Location:  OR     ORTHOPEDIC SURGERY  2001    Carpal tunnel        Medications:   Current Outpatient Prescriptions:      HYDROcodone-acetaminophen (NORCO) 5-325 MG per tablet, Take 1-2 tablets by mouth every 8 hours as needed for other (Moderate to Severe Pain) Maximum: 6 tablets per day., Disp: 30 tablet, Rfl: 0     senna-docusate (SENOKOT-S;PERICOLACE) 8.6-50 MG per tablet, Take 1-2 tablets by mouth 2 times daily Take while on oral narcotics to prevent or treat constipation., Disp: 30 tablet, Rfl: 0     aspirin  MG EC tablet, Take 1 tablet (325 mg) by mouth 2 times daily (with meals) for 14 days, Disp: 28 tablet, Rfl: 0     busPIRone (BUSPAR) 15 MG tablet, Take 1 tablet (15 mg) by mouth 2 times daily, Disp: 180 tablet, Rfl: 3     albuterol (PROAIR HFA, PROVENTIL HFA, VENTOLIN HFA) 108 (90 BASE) MCG/ACT inhaler, Inhale 2 puffs into the lungs every 6 hours as needed for shortness of breath / dyspnea, Disp: 1 Inhaler, Rfl: 0     methylphenidate (CONCERTA) 27 MG CR tablet, 27 mg, Disp: , Rfl:      sildenafil (VIAGRA) 100 MG tablet, Take 100 mg by mouth daily as needed., Disp: , Rfl:      CLONAZEPAM PO, Take  by mouth 2 times daily., Disp: , Rfl:     Allergies: No Known Allergies     This document serves as a record  "of the services and decisions personally performed and made by Reinaldo Mclaughlin MD. It was created on his behalf by Trena Culver, a trained medical scribe. The creation of this document is based the provider's statements to the medical scribe.    Scribedgardo Culver 1:34 PM 5/4/2017     REVIEW OF SYSTEMS:   CONSTITUTIONAL:NEGATIVE for fever, chills, night sweats  INTEGUMENTARY/SKIN: NEGATIVE for worrisome wound problems or redness.  MUSCULOSKELETAL:See HPI above  NEURO: NEGATIVE for weakness, dizziness or paresthesias    PHYSICAL EXAM:  Resp 16  Ht 1.74 m (5' 8.5\")  Wt 99.9 kg (220 lb 3.2 oz)  BMI 32.99 kg/m2   GENERAL APPEARANCE: healthy, alert, no distress  SKIN: no suspicious lesions or rashes  NEURO: Normal strength and tone, mentation intact and speech normal  PSYCH:  mentation appears normal and affect normal, not anxious  RESPIRATORY: No increased work of breathing.    left  LOWER EXTREMITY:  Gait: quadriceps favoring left  No Quad atrophy, strength weak  Intact sensation deep peroneal nerve, superficial peroneal nerve, med/lat tibial nerve, sural nerve, saphenous nerve  Intact EHL, EDL, TA, FHL, GS, quadriceps hamstrings and hip flexors  Toes warm and well perfused, brisk capillary refill. Palpable 2+ dp pulses.  calf soft and nttp or squeeze.  Edema: 1+    left  KNEE EXAM:    Skin: intact, mild ecchymosis, no erythema; small scab medially from bandage  Incisions: skin edges well approximated, sutures in place. Dry. No erythema.  ROM: 0 extension to 115 flexion  Effusion: small  Tender: mild diffuse.        X-RAY: none indicated.      Impression: Geovani Bella is a 36 year old male 2 weeks status post left knee arthroscopy and  partial medial meniscectomy and shaving chondroplasty , doing well.       Plan: routine postoperative knee arthroscopy  * suture removal  * reviewed arthroscopy photos.    * WB status: Cautioned not to overdo it too quickly. Increased activities too soon will lead to more swelling " "of the knee and leg, more pain, slower recovery. Expect 6-8 weeks. I suspect that he is up more than recommended, and needs more rest and elevation \"toes above the nose\"    * Rest  * Activity modification - avoid activities that aggravate symptoms.  * NSAIDS - regular use for inflammation, with food, as long as no contra-indications. Please discuss with pcp if needed.  * Ice twice daily to three times daily as needed.  * Compression wrap as needed.  * Elevation of extremity to reduce swelling as needed.  * PT ordered for strengthening, stretching and range of motion exercises, effusion control.  * Tylenol as needed for pain  * Unemployment papers filled out today, per patient's request. Copied for our records.  * Workability update: able to return to work with light duty restrictions. Sedentary duties with intermittent standing and walking.  * return to clinic 4 weeks, sooner as needed.        The information in this document, created by a scribe for me, accurately reflects the services I personally performed and the decisions made by me. I have reviewed and approved this document for accuracy.        Reinaldo Mclaughlin M.D., M.S.  Dept. of Orthopaedic Surgery  [facility]      Again, thank you for allowing me to participate in the care of your patient.        Sincerely,              Reinaldo Mclaughlin MD  "

## 2017-05-04 NOTE — LETTER
5/4/2017      RE: Geovani Bella  [address] [address]       Chief Complaint   Patient presents with     Surgical Followup     Left knee scope - partial MM. DOS 4/20/17, 2 week PO. Patient states his knee is doing well. He has a lot of swelling in his calf and ankle which causes pain. He has been icing and elevating. He has been semi active. He continues to take the pain medication, taking it as prescribed.        SURGERY: left knee arthroscopy . ( [facility] )  1. Left knee arthroscopic partial medial meniscectomy.   2. Left knee shaving chondroplasty, patella and medial femoral condyle.     DATE OF SURGERY: 4/20/2017.      HISTORY OF PRESENT ILLNESS:  Geovani Bella is a 36 year old male seen for postoperative evaluation of a left knee arthroscopy and partial medial meniscectomy and shaving chondroplasty for left knee medial meniscus tear, medial and patellofemoral chondrosis and parameniscal cyst. Surgery occurred 2 weeks ago. Returns today stating his knee is doing well. Reports a lot of swelling in calf and ankle. Pain has been tolerable, rated 6/10. He has been icing and elevating. Has been semi- active. Continues to take pain medication as prescribed. No problems with the surgical wounds. Denies fevers chills or night sweats. No associated numbness or tingling. Has been non compliant with weightbearing restrictions since surgery as recommended. Taking aspirin daily. He had a DVT scan postoperative due to swelling which was negative.    Patient would like unemployment papers to be filled out today. Patient lost his job on March 24, 2017. Since that time, he has not been employed.        OR FINDINGS: Minimal synovitis. No effusion. Grade 2 and 3 chondrosis of the lateral patellar facet. Grade 1 chondrosis femoral trochlea. Intact ACL. Lateral compartment with intact lateral meniscus. Grade 1 chondrosis. Medial compartment with horizontal cleavage tear of the posterior  horn and body of the medial meniscus. Grade 2 and 3 chondrosis of the medial femoral condyle.     Past Medical History:   Diagnosis Date     Genital warts 6/13/2011     Muscle tension headache 2/22/2011     Opiate dependence (H) 12/4/2009    In remission     Tobacco use disorder, continuous 12/4/2009       Past Surgical History:   Procedure Laterality Date     ARTHROSCOPY KNEE Left 4/20/2017    Procedure: ARTHROSCOPY KNEE;  Left knee arthroscopy, medial meniscus debridement, chondroplasty;  Surgeon: Reinaldo Mclaughlin MD;  Location:  OR     ORTHOPEDIC SURGERY  2001    Carpal tunnel        Medications:   Current Outpatient Prescriptions:      HYDROcodone-acetaminophen (NORCO) 5-325 MG per tablet, Take 1-2 tablets by mouth every 8 hours as needed for other (Moderate to Severe Pain) Maximum: 6 tablets per day., Disp: 30 tablet, Rfl: 0     senna-docusate (SENOKOT-S;PERICOLACE) 8.6-50 MG per tablet, Take 1-2 tablets by mouth 2 times daily Take while on oral narcotics to prevent or treat constipation., Disp: 30 tablet, Rfl: 0     aspirin  MG EC tablet, Take 1 tablet (325 mg) by mouth 2 times daily (with meals) for 14 days, Disp: 28 tablet, Rfl: 0     busPIRone (BUSPAR) 15 MG tablet, Take 1 tablet (15 mg) by mouth 2 times daily, Disp: 180 tablet, Rfl: 3     albuterol (PROAIR HFA, PROVENTIL HFA, VENTOLIN HFA) 108 (90 BASE) MCG/ACT inhaler, Inhale 2 puffs into the lungs every 6 hours as needed for shortness of breath / dyspnea, Disp: 1 Inhaler, Rfl: 0     methylphenidate (CONCERTA) 27 MG CR tablet, 27 mg, Disp: , Rfl:      sildenafil (VIAGRA) 100 MG tablet, Take 100 mg by mouth daily as needed., Disp: , Rfl:      CLONAZEPAM PO, Take  by mouth 2 times daily., Disp: , Rfl:     Allergies: No Known Allergies     This document serves as a record of the services and decisions personally performed and made by Reinaldo Mclaughlin MD. It was created on his behalf by Trena Culver, a trained medical scribe. The creation of this  "document is based the provider's statements to the medical scribe.    Omi Culver 1:34 PM 5/4/2017     REVIEW OF SYSTEMS:   CONSTITUTIONAL:NEGATIVE for fever, chills, night sweats  INTEGUMENTARY/SKIN: NEGATIVE for worrisome wound problems or redness.  MUSCULOSKELETAL:See HPI above  NEURO: NEGATIVE for weakness, dizziness or paresthesias    PHYSICAL EXAM:  Resp 16  Ht 1.74 m (5' 8.5\")  Wt 99.9 kg (220 lb 3.2 oz)  BMI 32.99 kg/m2   GENERAL APPEARANCE: healthy, alert, no distress  SKIN: no suspicious lesions or rashes  NEURO: Normal strength and tone, mentation intact and speech normal  PSYCH:  mentation appears normal and affect normal, not anxious  RESPIRATORY: No increased work of breathing.    left  LOWER EXTREMITY:  Gait: quadriceps favoring left  No Quad atrophy, strength weak  Intact sensation deep peroneal nerve, superficial peroneal nerve, med/lat tibial nerve, sural nerve, saphenous nerve  Intact EHL, EDL, TA, FHL, GS, quadriceps hamstrings and hip flexors  Toes warm and well perfused, brisk capillary refill. Palpable 2+ dp pulses.  calf soft and nttp or squeeze.  Edema: 1+    left  KNEE EXAM:    Skin: intact, mild ecchymosis, no erythema; small scab medially from bandage  Incisions: skin edges well approximated, sutures in place. Dry. No erythema.  ROM: 0 extension to 115 flexion  Effusion: small  Tender: mild diffuse.        X-RAY: none indicated.      Impression: Geovani Bella is a 36 year old male 2 weeks status post left knee arthroscopy and  partial medial meniscectomy and shaving chondroplasty , doing well.       Plan: routine postoperative knee arthroscopy  * suture removal  * reviewed arthroscopy photos.    * WB status: Cautioned not to overdo it too quickly. Increased activities too soon will lead to more swelling of the knee and leg, more pain, slower recovery. Expect 6-8 weeks. I suspect that he is up more than recommended, and needs more rest and elevation \"toes above the " "nose\"    * Rest  * Activity modification - avoid activities that aggravate symptoms.  * NSAIDS - regular use for inflammation, with food, as long as no contra-indications. Please discuss with pcp if needed.  * Ice twice daily to three times daily as needed.  * Compression wrap as needed.  * Elevation of extremity to reduce swelling as needed.  * PT ordered for strengthening, stretching and range of motion exercises, effusion control.  * Tylenol as needed for pain  * Unemployment papers filled out today, per patient's request. Copied for our records.  * Workability update: able to return to work with light duty restrictions. Sedentary duties with intermittent standing and walking.  * return to clinic 4 weeks, sooner as needed.        The information in this document, created by a scribe for me, accurately reflects the services I personally performed and the decisions made by me. I have reviewed and approved this document for accuracy.        Reinaldo Mclaughlin M.D., M.S.  Dept. of Orthopaedic Surgery  [facility]            Reinaldo Mclaughlin MD    "

## 2017-05-09 ENCOUNTER — THERAPY VISIT (OUTPATIENT)
Dept: PHYSICAL THERAPY | Facility: CLINIC | Age: 37
End: 2017-05-09
Payer: COMMERCIAL

## 2017-05-09 ENCOUNTER — TELEPHONE (OUTPATIENT)
Dept: FAMILY MEDICINE | Facility: CLINIC | Age: 37
End: 2017-05-09

## 2017-05-09 DIAGNOSIS — M25.562 CHRONIC PAIN OF LEFT KNEE: Primary | ICD-10-CM

## 2017-05-09 DIAGNOSIS — R26.9 ABNORMAL GAIT: ICD-10-CM

## 2017-05-09 DIAGNOSIS — S83.232D COMPLEX TEAR OF MEDIAL MENISCUS OF LEFT KNEE AS CURRENT INJURY, SUBSEQUENT ENCOUNTER: ICD-10-CM

## 2017-05-09 DIAGNOSIS — G89.29 CHRONIC PAIN OF LEFT KNEE: Primary | ICD-10-CM

## 2017-05-09 DIAGNOSIS — R60.0 LOCALIZED EDEMA: ICD-10-CM

## 2017-05-09 PROBLEM — R60.9 EDEMA: Status: ACTIVE | Noted: 2017-05-09

## 2017-05-09 PROCEDURE — 97110 THERAPEUTIC EXERCISES: CPT | Mod: GP | Performed by: PHYSICAL THERAPIST

## 2017-05-09 PROCEDURE — 97161 PT EVAL LOW COMPLEX 20 MIN: CPT | Mod: GP | Performed by: PHYSICAL THERAPIST

## 2017-05-09 PROCEDURE — 97016 VASOPNEUMATIC DEVICE THERAPY: CPT | Mod: GP | Performed by: PHYSICAL THERAPIST

## 2017-05-09 PROCEDURE — 97116 GAIT TRAINING THERAPY: CPT | Mod: GP | Performed by: PHYSICAL THERAPIST

## 2017-05-09 ASSESSMENT — ACTIVITIES OF DAILY LIVING (ADL)
AS_A_RESULT_OF_YOUR_KNEE_INJURY,_HOW_WOULD_YOU_RATE_YOUR_CURRENT_LEVEL_OF_DAILY_ACTIVITY?: SEVERELY ABNORMAL
STAND: ACTIVITY IS FAIRLY DIFFICULT
WEAKNESS: THE SYMPTOM PREVENTS ME FROM ALL DAILY ACTIVITIES
STIFFNESS: THE SYMPTOM AFFECTS MY ACTIVITY SEVERELY
HOW_WOULD_YOU_RATE_THE_OVERALL_FUNCTION_OF_YOUR_KNEE_DURING_YOUR_USUAL_DAILY_ACTIVITIES?: SEVERELY ABNORMAL
KNEE_ACTIVITY_OF_DAILY_LIVING_SUM: 14
KNEEL ON THE FRONT OF YOUR KNEE: I AM UNABLE TO DO THE ACTIVITY
RISE FROM A CHAIR: ACTIVITY IS SOMEWHAT DIFFICULT
LIMPING: THE SYMPTOM PREVENTS ME FROM ALL DAILY ACTIVITIES
WALK: ACTIVITY IS FAIRLY DIFFICULT
HOW_WOULD_YOU_RATE_THE_CURRENT_FUNCTION_OF_YOUR_KNEE_DURING_YOUR_USUAL_DAILY_ACTIVITIES_ON_A_SCALE_FROM_0_TO_100_WITH_100_BEING_YOUR_LEVEL_OF_KNEE_FUNCTION_PRIOR_TO_YOUR_INJURY_AND_0_BEING_THE_INABILITY_TO_PERFORM_ANY_OF_YOUR_USUAL_DAILY_ACTIVITIES?: 60
SQUAT: I AM UNABLE TO DO THE ACTIVITY
SIT WITH YOUR KNEE BENT: ACTIVITY IS MINIMALLY DIFFICULT
SWELLING: THE SYMPTOM PREVENTS ME FROM ALL DAILY ACTIVITIES
GO UP STAIRS: ACTIVITY IS VERY DIFFICULT
KNEE_ACTIVITY_OF_DAILY_LIVING_SCORE: 20
GIVING WAY, BUCKLING OR SHIFTING OF KNEE: THE SYMPTOM PREVENTS ME FROM ALL DAILY ACTIVITIES
RAW_SCORE: 14
GO DOWN STAIRS: ACTIVITY IS VERY DIFFICULT
PAIN: THE SYMPTOM PREVENTS ME FROM ALL DAILY ACTIVITIES

## 2017-05-09 NOTE — PROGRESS NOTES
[facility] Initial Evaluation    Subjective:    Patient is a 36 year old male presenting with rehab left knee hpi.   Geovani Bella is a 36 year old male with a left knee condition.  Condition occurred with:  Other reason.  Condition occurred: other.  This is a chronic condition  Medial meniscectomy and shaving chondroplasty on 4/20/17.    Patient reports pain:  Medial, lateral and anterior.  Radiates to: calf.  Pain is described as aching and is constant and reported as 6/10 and 4/10.  Associated symptoms:  Edema (significant edema in lower leg and foot). Pain is worse in the P.M. and worse in the A.M. (during the day it feels the best).  Symptoms are exacerbated by walking, ascending stairs and descending stairs and relieved by ice and NSAID's (elevation).  Since onset symptoms are gradually worsening (swelling is worse over the last few days).  Special tests:  MRI (see chart).  Previous treatment includes surgery (see above).    General health as reported by patient is good.  Pertinent medical history includes:  Depression and sleep disorder/apnea.  Medical allergies: no.  Other surgeries include:  None reported.  Current medications:  Pain medication, anti-inflammatory and anti-depressants.  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    Primary job tasks include:  Prolonged standing (walking).    Barriers include:  None as reported by the patient.    Red flags:  None as reported by the patient.                        Objective:      Gait:    Gait Type:  Antalgic     Deviations:  Knee:  Knee extension decr L and knee flexion decr L                                                      Knee Evaluation:  ROM:    AROM    Hyperextension:  Left:  0    Right: 0  Extension:  Left: 15    Right:  0  Flexion: Left: 104    Right: 125  PROM    Hyperextension: Left: 0    Right:  3  Extension: Left: 10    Right:  0  Flexion: Left: 107 with pain    Right:  134  Pain: end range flexion  and extension    Strength:     Extension:  Left:/5   Pain:++      Right: 4+/5   Pain:  Flexion:  Left: 3+/5   Pain:      Right: 5/5   Pain:    Quad Set Left: Delayed and poor    Pain:   Quad Set Right: Good    Pain:  Ligament Testing:  Not Assessed                    Edema:  Edema of the knee: more signficant edema in the left calf/ankle/foot - ankle figure 8 measurement 56 cm vs 51 cm opposite side.  Circumference:      Joint Line:  Left:  42.5 cm   Right:  39 cm            General     ROS    Assessment/Plan:      Patient is a 36 year old male with left side knee complaints.    Patient has the following significant findings with corresponding treatment plan.                Diagnosis 1:  Knee pain, decreased ROM and strength s/p medial meniscectomy    Pain -  hot/cold therapy, manual therapy, education and home program  Decreased ROM/flexibility - manual therapy, therapeutic exercise and home program  Decreased strength - therapeutic exercise, therapeutic activities and home program  Edema - vasopneumatics and cryocuff  Impaired gait - gait training and home program  Impaired muscle performance - neuro re-education and home program    Therapy Evaluation Codes:   1) History comprised of:   Personal factors that impact the plan of care:      Age, Coping style and Past/current experiences.    Comorbidity factors that impact the plan of care are:      None.     Medications impacting care: None.  2) Examination of Body Systems comprised of:   Body structures and functions that impact the plan of care:      Knee.   Activity limitations that impact the plan of care are:      Sitting, Squatting/kneeling, Stairs, Standing and Walking.  3) Clinical presentation characteristics are:   Stable/Uncomplicated.  4) Decision-Making    Low complexity using standardized patient assessment instrument and/or measureable assessment of functional outcome.  Cumulative Therapy Evaluation is: Low complexity.    Previous and current  functional limitations:  (See Goal Flow Sheet for this information)    Short term and Long term goals: (See Goal Flow Sheet for this information)     Communication ability:  Patient appears to be able to clearly communicate and understand verbal and written communication and follow directions correctly.  Treatment Explanation - The following has been discussed with the patient:   RX ordered/plan of care  Anticipated outcomes  Possible risks and side effects  This patient would benefit from PT intervention to resume normal activities.   Rehab potential is good.    Frequency:  1 X week, once daily  Duration:  for 6 weeks  Discharge Plan:  Achieve all LTG.  Independent in home treatment program.  Reach maximal therapeutic benefit.    Please refer to the daily flowsheet for treatment today, total treatment time and time spent performing 1:1 timed codes.

## 2017-05-09 NOTE — MR AVS SNAPSHOT
After Visit Summary   5/9/2017    Geovani Bella    MRN: [id number]           Patient Information     Date Of Birth          1980        Visit Information        Provider Department      5/9/2017 11:30 AM Steve James, PT [facility] [place] [facility]        Today's Diagnoses     Chronic pain of left knee    -  1    Localized edema        Abnormal gait           Follow-ups after your visit        Your next 10 appointments already scheduled     May 16, 2017  1:20 PM CDT   PING Extremity with Steve James PT   [facility] [place] [facility] ([facility] [place])    [address]  [address]  [place] [address]   [phone]            May 23, 2017 12:40 PM CDT   PING Extremity with Steve James PT   [facility] [place] [facility] ([facility] [place])    [address]  [place] [address]   [phone]            May 24, 2017 10:30 AM CDT   New Visit with Fco Jackson, [facility] ([address] [address] [phone]            May 31, 2017  9:30 AM CDT   Return Visit with Fco Jackson [facility] ([address] [address] [phone]              Who to contact     If you have questions or need follow up information about today's clinic visit or your schedule please contact [facility] [place] [facility] directly at [phone].  Normal or non-critical lab and imaging results will be communicated to you by MyChart, letter or phone within 4 business days after the clinic has received the results. If you do not hear from us within 7 days, please contact the clinic through MyChart or phone. If you have a critical or abnormal lab result, we will notify you by phone as soon as possible.  Submit refill requests through [employer] or  call your pharmacy and they will forward the refill request to us. Please allow 3 business days for your refill to be completed.          Additional Information About Your Visit        [employer] Information     [employer] gives you secure access to your electronic health record. If you see a primary care provider, you can also send messages to your care team and make appointments. If you have questions, please call your primary care clinic.  If you do not have a primary care provider, please call [phone] and they will assist you.        Care EveryWhere ID     This is your Care EveryWhere ID. This could be used by other organizations to access your [facility] medical records  [id number]         Blood Pressure from Last 3 Encounters:   04/20/17 140/80   04/13/17 122/74   03/27/17 118/80    Weight from Last 3 Encounters:   05/04/17 99.9 kg (220 lb 3.2 oz)   04/13/17 99.8 kg (220 lb)   04/06/17 99.8 kg (220 lb)              We Performed the Following     C VASOPNEUMATIC DEVICE     GAIT TRAINING THERAPY     HC PT EVAL, LOW COMPLEXITY     PING INITIAL EVAL REPORT     THERAPEUTIC EXERCISES        Primary Care Provider Office Phone # Fax #    Keo Phillips PA-C [phone]       [address] [address]        Thank you!     Thank you for choosing [facility] [place] [facility]  for your care. Our goal is always to provide you with excellent care. Hearing back from our patients is one way we can continue to improve our services. Please take a few minutes to complete the written survey that you may receive in the mail after your visit with us. Thank you!             Your Updated Medication List - Protect others around you: Learn how to safely use, store and throw away your medicines at www.disposemymeds.org.          This list is accurate as of: 5/9/17 12:58 PM.  Always use your most recent med list.                   Brand Name Dispense  Instructions for use    albuterol 108 (90 BASE) MCG/ACT Inhaler    PROAIR HFA/PROVENTIL HFA/VENTOLIN HFA    1 Inhaler    Inhale 2 puffs into the lungs every 6 hours as needed for shortness of breath / dyspnea       busPIRone 15 MG tablet    BUSPAR    180 tablet    Take 1 tablet (15 mg) by mouth 2 times daily       CLONAZEPAM PO      Take  by mouth 2 times daily.       HYDROcodone-acetaminophen 5-325 MG per tablet    NORCO    30 tablet    Take 1-2 tablets by mouth every 8 hours as needed for other (Moderate to Severe Pain) Maximum: 6 tablets per day.       methylphenidate ER 27 MG CR tablet    CONCERTA     27 mg       senna-docusate 8.6-50 MG per tablet    SENOKOT-S;PERICOLACE    30 tablet    Take 1-2 tablets by mouth 2 times daily Take while on oral narcotics to prevent or treat constipation.       VIAGRA 100 MG cap/tab   Generic drug:  sildenafil      Take 100 mg by mouth daily as needed.

## 2017-05-09 NOTE — TELEPHONE ENCOUNTER
Reason for call: Medication   If this is a refill request, has the caller requested the refill from the pharmacy already? No  Will the patient be using a [facility]? no  Name of the pharmacy and phone number for the current request: [facility]    Name of the medication requested: Hyrocodone    Other request: FYI-He went to PT and they recommended he use a crutch again. PT said 6 more weeks is recommended. His left calf, ankle and foot is swollen.    Phone Number Pt can be reached at: Home number on file [phone] (home)  Best Time: anytime  Can we leave a detailed message on this number? YES

## 2017-05-10 RX ORDER — HYDROCODONE BITARTRATE AND ACETAMINOPHEN 5; 325 MG/1; MG/1
1 TABLET ORAL EVERY 8 HOURS PRN
Qty: 20 TABLET | Refills: 0 | Status: SHIPPED | OUTPATIENT
Start: 2017-05-10 | End: 2017-12-29

## 2017-05-10 NOTE — TELEPHONE ENCOUNTER
Called and spoke to patient letting him know his prescription was ready for  at the [facility] . He will be in to pick it up. Norco 5/325 mg, #20, zero refills, 1 tab every 8 hours as needed for pain. I left it at the .  Francia Jacob Certified Medical Assistant

## 2017-05-16 ENCOUNTER — THERAPY VISIT (OUTPATIENT)
Dept: PHYSICAL THERAPY | Facility: CLINIC | Age: 37
End: 2017-05-16
Payer: COMMERCIAL

## 2017-05-16 DIAGNOSIS — G89.29 CHRONIC PAIN OF LEFT KNEE: ICD-10-CM

## 2017-05-16 DIAGNOSIS — R26.9 ABNORMAL GAIT: ICD-10-CM

## 2017-05-16 DIAGNOSIS — R60.0 LOCALIZED EDEMA: ICD-10-CM

## 2017-05-16 DIAGNOSIS — M25.562 CHRONIC PAIN OF LEFT KNEE: ICD-10-CM

## 2017-05-16 PROCEDURE — 97112 NEUROMUSCULAR REEDUCATION: CPT | Mod: GP | Performed by: PHYSICAL THERAPIST

## 2017-05-16 PROCEDURE — 97110 THERAPEUTIC EXERCISES: CPT | Mod: GP | Performed by: PHYSICAL THERAPIST

## 2017-05-16 PROCEDURE — 97016 VASOPNEUMATIC DEVICE THERAPY: CPT | Mod: GP | Performed by: PHYSICAL THERAPIST

## 2017-05-16 NOTE — PROGRESS NOTES
Subjective:    HPI                    Objective:    System    Physical Exam    General     ROS    Assessment/Plan:      SUBJECTIVE  Subjective changes as noted by pt: Doing better. Stopped using the cane a few days ago.   Current pain level: 2/10   Changes in function:  Yes (See Goal flowsheet attached for changes in current functional level)     Adverse reaction to treatment or activity:  None    OBJECTIVE  Changes in objective findings: Left knee 43 cm; left ankle figure 8 54 cm. Improving gait quality but still lacking TKE during stance on the left. A/PROM L knee flexion 110/115; extension 5/0.      ASSESSMENT  Geovani continues to require intervention to meet STG and LTG's: PT  Patient is progressing as expected.  Response to therapy has shown an improvement in edema, pain level and ROM   Progress made towards STG/LTG?  Yes (See Goal flowsheet attached for updates on achievement of STG and LTG)    PLAN  Current treatment program is being advanced to more complex exercises.    PTA/ATC plan:  N/A    Please refer to the daily flowsheet for treatment today, total treatment time and time spent performing 1:1 timed codes.

## 2017-05-16 NOTE — MR AVS SNAPSHOT
After Visit Summary   5/16/2017    Geovani Bella    MRN: [id number]           Patient Information     Date Of Birth          1980        Visit Information        Provider Department      5/16/2017 1:20 PM Steve James, PT [facility] [place] [facility]        Today's Diagnoses     Chronic pain of left knee        Localized edema        Abnormal gait           Follow-ups after your visit        Your next 10 appointments already scheduled     May 23, 2017 12:40 PM CDT   PING Extremity with Steve James PT   [facility] [place] [facility] ([facility] [place])    [address] [facility] [address]  [place] [address]   [phone]            May 24, 2017 10:30 AM CDT   New Visit with Fco Jackson [facility] ([address] [address]   [phone]            May 31, 2017  9:30 AM CDT   Return Visit with Fco Jackson [facility] ([address] [address]   [phone]            May 31, 2017  2:15 PM CDT   Return Visit with Reinaldo Mclaughlin MD   [facility] ([facility])    [address] [place] [id number] [phone]              Who to contact     If you have questions or need follow up information about today's clinic visit or your schedule please contact [facility] [place] [facility] directly at [phone].  Normal or non-critical lab and imaging results will be communicated to you by MyChart, letter or phone within 4 business days after the clinic has received the results. If you do not hear from us within 7 days, please contact the clinic through MyChart or phone. If you have a critical or abnormal lab result, we will notify you by phone as soon as possible.  Submit refill requests through [employer] or call  your pharmacy and they will forward the refill request to us. Please allow 3 business days for your refill to be completed.          Additional Information About Your Visit        Celecthart Information     KSKT gives you secure access to your electronic health record. If you see a primary care provider, you can also send messages to your care team and make appointments. If you have questions, please call your primary care clinic.  If you do not have a primary care provider, please call [phone] and they will assist you.        Care EveryWhere ID     This is your Care EveryWhere ID. This could be used by other organizations to access your [facility] medical records  [id number]         Blood Pressure from Last 3 Encounters:   04/20/17 140/80   04/13/17 122/74   03/27/17 118/80    Weight from Last 3 Encounters:   05/04/17 99.9 kg (220 lb 3.2 oz)   04/13/17 99.8 kg (220 lb)   04/06/17 99.8 kg (220 lb)              We Performed the Following     C VASOPNEUMATIC DEVICE     NEUROMUSCULAR RE-EDUCATION     THERAPEUTIC EXERCISES        Primary Care Provider Office Phone # Fax #    Keo Phillips PA-C [phone]       [address] [address]        Thank you!     Thank you for choosing [facility]  for your care. Our goal is always to provide you with excellent care. Hearing back from our patients is one way we can continue to improve our services. Please take a few minutes to complete the written survey that you may receive in the mail after your visit with us. Thank you!             Your Updated Medication List - Protect others around you: Learn how to safely use, store and throw away your medicines at www.disposemymeds.org.          This list is accurate as of: 5/16/17  2:08 PM.  Always use your most recent med list.                   Brand Name Dispense Instructions for use    albuterol 108 (90 BASE) MCG/ACT Inhaler     PROAIR HFA/PROVENTIL HFA/VENTOLIN HFA    1 Inhaler    Inhale 2 puffs into the lungs every 6 hours as needed for shortness of breath / dyspnea       busPIRone 15 MG tablet    BUSPAR    180 tablet    Take 1 tablet (15 mg) by mouth 2 times daily       CLONAZEPAM PO      Take  by mouth 2 times daily.       HYDROcodone-acetaminophen 5-325 MG per tablet    NORCO    20 tablet    Take 1 tablet by mouth every 8 hours as needed for other (Moderate to Severe Pain) Maximum: 3 tablets per day.       methylphenidate ER 27 MG CR tablet    CONCERTA     27 mg       senna-docusate 8.6-50 MG per tablet    SENOKOT-S;PERICOLACE    30 tablet    Take 1-2 tablets by mouth 2 times daily Take while on oral narcotics to prevent or treat constipation.       VIAGRA 100 MG cap/tab   Generic drug:  sildenafil      Take 100 mg by mouth daily as needed.

## 2017-08-14 NOTE — PROGRESS NOTES
This patient did not return to Physical Therapy to complete their plan of care, thus, full DC status is unknown. Please refer to the SOAP note dated 5/16/17 for discharge information.   This bout of care ranged from 5/9/17 to 5/16/17.  Discharge patient from PT at this time.

## 2017-09-15 ENCOUNTER — TELEPHONE (OUTPATIENT)
Dept: FAMILY MEDICINE | Facility: CLINIC | Age: 37
End: 2017-09-15

## 2017-09-15 NOTE — TELEPHONE ENCOUNTER
Please repeat PHQ-9.  Index date: 3/27/17  Follow up start date:  8/27/17  Follow up end date:  10/27/17    Maureen May MA

## 2017-09-22 ENCOUNTER — OFFICE VISIT (OUTPATIENT)
Dept: FAMILY MEDICINE | Facility: CLINIC | Age: 37
End: 2017-09-22
Payer: COMMERCIAL

## 2017-09-22 VITALS
DIASTOLIC BLOOD PRESSURE: 77 MMHG | SYSTOLIC BLOOD PRESSURE: 115 MMHG | WEIGHT: 210 LBS | BODY MASS INDEX: 31.47 KG/M2 | OXYGEN SATURATION: 97 % | HEART RATE: 87 BPM | TEMPERATURE: 98.8 F

## 2017-09-22 DIAGNOSIS — J20.9 BRONCHITIS WITH BRONCHOSPASM: ICD-10-CM

## 2017-09-22 PROCEDURE — 99214 OFFICE O/P EST MOD 30 MIN: CPT | Mod: 25 | Performed by: FAMILY MEDICINE

## 2017-09-22 PROCEDURE — 94640 AIRWAY INHALATION TREATMENT: CPT | Performed by: FAMILY MEDICINE

## 2017-09-22 RX ORDER — ALBUTEROL SULFATE 0.83 MG/ML
1 SOLUTION RESPIRATORY (INHALATION) EVERY 6 HOURS PRN
Qty: 25 VIAL | Refills: 0 | Status: SHIPPED | OUTPATIENT
Start: 2017-09-22 | End: 2020-02-11

## 2017-09-22 RX ORDER — CODEINE PHOSPHATE AND GUAIFENESIN 10; 100 MG/5ML; MG/5ML
1 SOLUTION ORAL EVERY 4 HOURS PRN
Qty: 120 ML | Refills: 0 | Status: SHIPPED | OUTPATIENT
Start: 2017-09-22 | End: 2017-11-13

## 2017-09-22 RX ORDER — ALBUTEROL SULFATE 90 UG/1
2 AEROSOL, METERED RESPIRATORY (INHALATION) EVERY 6 HOURS PRN
Qty: 1 INHALER | Refills: 0 | Status: SHIPPED | OUTPATIENT
Start: 2017-09-22 | End: 2017-12-29

## 2017-09-22 NOTE — MR AVS SNAPSHOT
After Visit Summary   9/22/2017    Geovani Bella    MRN: [id number]           Patient Information     Date Of Birth          1980        Visit Information        Provider Department      9/22/2017 9:40 AM Campbell Lopez MD [facility]        Today's Diagnoses     Bronchitis with bronchospasm           Follow-ups after your visit        Your next 10 appointments already scheduled     Sep 28, 2017 10:30 AM CDT   New Visit with Fco [facility] ([facility])    [address] [place] [address] [phone]              Who to contact     If you have questions or need follow up information about today's clinic visit or your schedule please contact [facility] directly at [phone].  Normal or non-critical lab and imaging results will be communicated to you by MyChart, letter or phone within 4 business days after the clinic has received the results. If you do not hear from us within 7 days, please contact the clinic through MyChart or phone. If you have a critical or abnormal lab result, we will notify you by phone as soon as possible.  Submit refill requests through [employer] or call your pharmacy and they will forward the refill request to us. Please allow 3 business days for your refill to be completed.          Additional Information About Your Visit        MyChart Information     [employer] gives you secure access to your electronic health record. If you see a primary care provider, you can also send messages to your care team and make appointments. If you have questions, please call your primary care clinic.  If you do not have a primary care provider, please call [phone] and they will assist you.        Care EveryWhere ID     This is your Care EveryWhere ID. This could be used by other organizations to access your [facility] medical records  [id number]         Your Vitals Were     Pulse Temperature Pulse Oximetry BMI (Body Mass Index)          87 98.8  F (37.1  C) (Oral) 97% 31.47 kg/m2         Blood Pressure from Last 3 Encounters:   09/22/17 115/77   04/20/17 140/80   04/13/17 122/74    Weight from Last 3 Encounters:   09/22/17 210 lb (95.3 kg)   05/04/17 220 lb 3.2 oz (99.9 kg)   04/13/17 220 lb (99.8 kg)              Today, you had the following     No orders found for display         Today's Medication Changes          These changes are accurate as of: 9/22/17 10:41 AM.  If you have any questions, ask your nurse or doctor.               Start taking these medicines.        Dose/Directions    guaiFENesin-codeine 100-10 MG/5ML Soln solution   Commonly known as:  ROBITUSSIN AC   Used for:  Bronchitis with bronchospasm   Started by:  Campbell Lopez MD        Dose:  1 tsp.   Take 5 mLs by mouth every 4 hours as needed for cough   Quantity:  120 mL   Refills:  0         Stop taking these medicines if you haven't already. Please contact your care team if you have questions.     senna-docusate 8.6-50 MG per tablet   Commonly known as:  SENOKOT-S;PERICOLACE   Stopped by:  Campbell Lopez MD                Where to get your medicines      [facility] [address] [place] [address] [facility]  [address] [facility] [address]    Hours:  24-hours Phone:  [phone]     albuterol 108 (90 BASE) MCG/ACT Inhaler         Some of these will need a paper prescription and others can be bought over the counter.  Ask your nurse if you have questions.     Bring a paper prescription for each of these medications     guaiFENesin-codeine 100-10 MG/5ML Soln solution                Primary Care Provider Office Phone # Fax #    Keo Phillips PA-C [phone] [address] [facility] [address]        Equal Access to Services     YARELIS DAWKINS AH: Bella tinajero  brandon Savaeg, waaxda luqadaha, qaybta kaalmada rivas, ramon patel larosasharona ben. So [facility] [phone].    ATENCIÓN: Si ebony colin, tiene a ragsdale disposición servicios gratuitos de asistencia lingüística. Bryson al [phone].    We comply with applicable federal civil rights laws and [place] laws. We do not discriminate on the basis of race, color, national origin, age, disability sex, sexual orientation or gender identity.            Thank you!     Thank you for choosing [facility]  for your care. Our goal is always to provide you with excellent care. Hearing back from our patients is one way we can continue to improve our services. Please take a few minutes to complete the written survey that you may receive in the mail after your visit with us. Thank you!             Your Updated Medication List - Protect others around you: Learn how to safely use, store and throw away your medicines at www.disposemymeds.org.          This list is accurate as of: 9/22/17 10:41 AM.  Always use your most recent med list.                   Brand Name Dispense Instructions for use Diagnosis    albuterol 108 (90 BASE) MCG/ACT Inhaler    PROAIR HFA/PROVENTIL HFA/VENTOLIN HFA    1 Inhaler    Inhale 2 puffs into the lungs every 6 hours as needed for shortness of breath / dyspnea    Bronchitis with bronchospasm       busPIRone 15 MG tablet    BUSPAR    180 tablet    Take 1 tablet (15 mg) by mouth 2 times daily        CLONAZEPAM PO      Take  by mouth 2 times daily.        guaiFENesin-codeine 100-10 MG/5ML Soln solution    ROBITUSSIN AC    120 mL    Take 5 mLs by mouth every 4 hours as needed for cough    Bronchitis with bronchospasm       HYDROcodone-acetaminophen 5-325 MG per tablet    NORCO    20 tablet    Take 1 tablet by mouth every 8 hours as needed for other (Moderate to Severe Pain) Maximum: 3 tablets per day.    Complex tear of medial meniscus of left knee as current injury,  subsequent encounter       methylphenidate ER 27 MG CR tablet    CONCERTA     27 mg        VIAGRA 100 MG tablet   Generic drug:  sildenafil      Take 100 mg by mouth daily as needed.

## 2017-09-22 NOTE — PROGRESS NOTES
SUBJECTIVE:   Geovani Bella is a 36 year old male who presents to clinic today for the following health issues:    Acute Illness   Acute illness concerns: URI  Onset: 1 week ago    Fever: no     Chills/Sweats: YES    Headache (location?): YES    Sinus Pressure: yes    Conjunctivitis:  no    Ear Pain: no    Rhinorrhea: no     Congestion: YES    Sore Throat: no      Cough: YES-non-productive    Wheeze: YES    Decreased Appetite: no    Nausea: YES    Vomiting: YES    Diarrhea:  YES    Dysuria/Freq.: no     Fatigue/Achiness: YES    Sick/Strep Exposure: no        Patient was dizzy and headache   Now that is better / gone away     He felt he had food poisioning for first 24 hours   URI symptoms for the last 3 days   Problem list and histories reviewed & adjusted, as indicated.    Reviewed and updated as needed this visit by clinical staff     Reviewed and updated as needed this visit by Provider               O: /77  Pulse 87  Temp 98.8  F (37.1  C) (Oral)  Wt 210 lb (95.3 kg)  SpO2 97%  BMI 31.47 kg/m2      Head: Normocephalic, atraumatic.  Eyes: Conjunctiva clear, non icteric. PERRLA.  Ears: External ears and TMs normal BL.; fluid in the right tm   Nose: Septum midline, nasal mucosa pink and moist. No discharge.  Mouth / Throat: Normal dentition.  No oral lesions. Pharynx non erythematous, tonsils without hypertrophy.  Neck: Supple, no enlarged LN, trachea midline.    Chest wall normal to inspection and palpation. Good excursion bilaterally. Lungs clear to auscultation. Good air movement bilaterally without rales, wheezes, or rhonchi.   Regular rate and  rhythm. S1 and S2 normal, no murmurs, clicks, gallops or rubs. No edema or JVD.    Neb treatment given with clearing of his wheezing     Also it loosened up his cough so he could expectorate much easier       ICD-10-CM    1. Bronchitis with bronchospasm J20.9 albuterol (PROAIR HFA/PROVENTIL HFA/VENTOLIN HFA) 108 (90 BASE) MCG/ACT Inhaler      guaiFENesin-codeine (ROBITUSSIN AC) 100-10 MG/5ML SOLN solution     INHALATION/NEBULIZER TREATMENT, INITIAL     ALBUTEROL UNIT DOSE, 1 MG     order for DME     albuterol (2.5 MG/3ML) 0.083% neb solution     Showed proper technique for using inhaler   He will try this and decide if he wants a nebulizer if covered

## 2017-09-22 NOTE — NURSING NOTE
"Chief Complaint   Patient presents with     URI       Initial /77  Pulse 87  Temp 98.8  F (37.1  C) (Oral)  Wt 210 lb (95.3 kg)  SpO2 97%  BMI 31.47 kg/m2 Estimated body mass index is 31.47 kg/(m^2) as calculated from the following:    Height as of 5/4/17: 5' 8.5\" (1.74 m).    Weight as of this encounter: 210 lb (95.3 kg).  Medication Reconciliation: moisés Khoury MA      "

## 2017-09-28 ENCOUNTER — OFFICE VISIT (OUTPATIENT)
Dept: BEHAVIORAL HEALTH | Facility: CLINIC | Age: 37
End: 2017-09-28
Payer: COMMERCIAL

## 2017-09-28 DIAGNOSIS — F32.1 MODERATE MAJOR DEPRESSION (H): Primary | ICD-10-CM

## 2017-09-28 PROCEDURE — 90791 PSYCH DIAGNOSTIC EVALUATION: CPT | Performed by: SOCIAL WORKER

## 2017-09-28 ASSESSMENT — PATIENT HEALTH QUESTIONNAIRE - PHQ9: SUM OF ALL RESPONSES TO PHQ QUESTIONS 1-9: 11

## 2017-09-28 NOTE — MR AVS SNAPSHOT
MRN:[id number]                      After Visit Summary   9/28/2017    Geovani Bella    MRN: [id number]           Visit Information        Provider Department      9/28/2017 10:30 AM [facility]      Your next 10 appointments already scheduled     Oct 26, 2017  1:30 PM CDT   Return Visit with BANDAR Thapa   [facility] ([facility]    [address] [place] [id number] [phone]              MyChart Information     My[employer]hart gives you secure access to your electronic health record. If you see a primary care provider, you can also send messages to your care team and make appointments. If you have questions, please call your primary care clinic.  If you do not have a primary care provider, please call [phone] and they will assist you.        Care EveryWhere ID     This is your Care EveryWhere ID. This could be used by other organizations to access your [facility] medical records  [id number]        Equal Access to Services     YARELIS DAWKINS : Hadii aad ku hadasho Sosusie, waaxda luqadaha, qaybta kaalmada adetraceyabernice, ramon contreras. So [facility] [phone].    ATENCIÓN: Si habla español, tiene a ragsdale disposición servicios gratuitos de asistencia lingüística. Llame al [phone].    We comply with applicable federal civil rights laws and [place] laws. We do not discriminate on the basis of race, color, national origin, age, disability sex, sexual orientation or gender identity.
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
## 2017-10-02 NOTE — TELEPHONE ENCOUNTER
Chart reviewed. Patient has never opened / read any of the MY CHART messages that have been sent to him. PHQ 9 was completed 9/28/17 at appointment with David Wright.  Marie Cline RN

## 2017-10-12 ENCOUNTER — TELEPHONE (OUTPATIENT)
Dept: FAMILY MEDICINE | Facility: CLINIC | Age: 37
End: 2017-10-12

## 2017-10-12 NOTE — TELEPHONE ENCOUNTER
Forms received from: [facility] [employer]   Phone number listed: [phone]   Fax listed: [phone]  Date received: 10/12/2017  Form description: physician's statement of medical necessity  Once forms are completed, please return to [facility] via fax.  Form placed: in providers folder  Rosa Javier

## 2017-10-26 ENCOUNTER — OFFICE VISIT (OUTPATIENT)
Dept: BEHAVIORAL HEALTH | Facility: CLINIC | Age: 37
End: 2017-10-26
Payer: COMMERCIAL

## 2017-10-26 DIAGNOSIS — F32.1 MODERATE MAJOR DEPRESSION (H): Primary | ICD-10-CM

## 2017-10-26 PROCEDURE — 90834 PSYTX W PT 45 MINUTES: CPT | Performed by: SOCIAL WORKER

## 2017-10-26 NOTE — PROGRESS NOTES
Progress Note    Client Name: Geovani Bella  Date: 10/26/2017          Service Type: Individual      Session Start Time: 130  Session End Time: 215      Session Length: 38-52     Session #: 2     Attendees: Client attended alone    Treatment Plan Last Reviewed: 10/26/2017   PHQ-9 / DAVONTE-7 :      DATA      Progress Since Last Session (Related to Symptoms / Goals / Homework):   Symptoms: Stable    Homework: Partially completed      Episode of Care Goals: Satisfactory progress - PREPARATION (Decided to change - considering how); Intervened by negotiating a change plan and determining options / strategies for behavior change, identifying triggers, exploring social supports, and working towards setting a date to begin behavior change     Current / Ongoing Stressors and Concerns:     Hurt his back a few weeks ago.  Sounds like this has been an ongoing problem, just aggravated it.  Went in to see the PCP and it sounds like he will need to go in an get an MRI.  Wants to focus on depression and anxiety.  Discussed efforts client has made to resolve the situation/symptoms in the past.  Explored various response styles including active, passive, proactive, reactive, and inactive.  Discussed benefits of a proactive response style.  Reviewed strategies to cultivate a more proactive response style and identified barriers to this including fear, indifference, hopelessness, and low motivation.             Treatment Objective(s) Addressed in This Session:    Client will use identified behavioral and cognitive skills to challenge negative self talk 90% of the time.     Intervention:   CBT: - Discussed cognitive restructuring and behavioral activation.  Explored the connection between thoughts, feelings, and actions by using examples relative to client's presenting concerns.  Explained major domains of symptoms (cognitive, emotional, somatic, behavioral, interpersonal) and importance  of targeted and specific interventions to reduce symptoms of anxiety and depression.  Discussed role of symptom monitoring in cognitive behavioral treatment.          ASSESSMENT: Current Emotional / Mental Status (status of significant symptoms):   Risk status (Self / Other harm or suicidal ideation)   Client denies current fears or concerns for personal safety.   Client denies current or recent suicidal ideation or behaviors.   Client denies current or recent homicidal ideation or behaviors.   Client denies current or recent self injurious behavior or ideation.   Client denies other safety concerns.   A safety and risk management plan has not been developed at this time, however client was given the after-hours number / 911 should there be a change in any of these risk factors.     Appearance:   Appropriate    Eye Contact:   Good    Psychomotor Behavior: Normal    Attitude:   Cooperative    Orientation:   All   Speech    Rate / Production: Normal     Volume:  Normal    Mood:    Anxious    Affect:    Appropriate    Thought Content:  Clear    Thought Form:  Coherent  Logical    Insight:    Good      Medication Review:   No changes to current psychiatric medication(s)     Medication Compliance:   Yes     Changes in Health Issues:   None reported     Chemical Use Review:   Substance Use: Chemical use reviewed, no active concerns identified      Tobacco Use: No change in amount of tobacco use since last session.  Provided encouragement to quit      Collateral Reports Completed:   Not Applicable    PLAN: (Client Tasks / Therapist Tasks / Other)  1.  Client will call to make next appointment        BANDAR Shaw                                                         ________________________________________________________________________    Treatment Plan    Client's Name: Geovani Bella  YOB: 1980    Date: 10/26/2017     .DSM5 Diagnoses: (Sustained by DSM5 Criteria Listed  Above)  Diagnoses: 296.32 (F33.1) Major Depressive Disorder, Recurrent Episode, Moderate _  300.02 (F41.1) Generalized Anxiety Disorder  300.3 (F42) Obsessive Compulsive Disorder  309.81 (F43.10) Posttraumatic Stress Disorder (includes Posttraumatic Stress Disorder for Children 6 Years and Younger)  Without dissociative symptoms  Psychosocial & Contextual Factors: stress with girlfriend  WHODAS 2.0 (12 item)            This questionnaire asks about difficulties due to health conditions. Health conditions  include  disease or illnesses, other health problems that may be short or long lasting,  injuries, mental health or emotional problems, and problems with alcohol or drugs.                     Think back over the past 30 days and answer these questions, thinking about how much  difficulty you had doing the following activities. For each question, please [place] only  one response.    S1 Standing for long periods such as 30 minutes? Mild =           2   S2 Taking care of household responsibilities? None =         1   S3 Learning a new task, for example, learning how to get to a new place? Mild =           2   S4 How much of a problem do you have joining community activities (for example, festivals, Presybeterian or other activities) in the same way as anyone else can? Moderate =   3   S5 How much have you been emotionally affected by your health problems? Moderate =   3     In the past 30 days, how much difficulty did you have in:   S6 Concentrating on doing something for ten minutes? Moderate =   3   S7 Walking a long distance such as a kilometer (or equivalent)? None =         1   S8 Washing your whole body? None =         1   S9 Getting dressed? None =         1   S10 Dealing with people you do not know? None =         1   S11 Maintaining a friendship? Mild =           2   S12 Your day to day work? Mild =           2     H1 Overall, in the past 30 days, how many days were these difficulties present? Record number of  days 30   H2 In the past 30 days, for how many days were you totally unable to carry out your usual activities or work because of any health condition? Record number of days  0   H3 In the past 30 days, not counting the days that you were totally unable, for how many days did you cut back or reduce your usual activities or work because of any health condition? Record number of days 10       Referral / Collaboration:  Referral to another professional/service is not indicated at this time..    Anticipated number of session or this episode of care: 24-26      MeasurableTreatment Goal(s) related to diagnosis / functional impairment(s)  Goal-Depression: Client will decrease depressed mood    I will know I've met my goal when I am less depressed.      Objective #A (Client Action)    Status: New - Date: 10/26/2017    Client will use identified behavioral and cognitive skills to challenge negative self talk 90% of the time.    Intervention(s)  Therapist will provide psychoeducation, behavioral activation, and cognitive restructuring.      Objective #B  Client will complete at least 10 minutes of self-regulation practice (e.g.: yoga, meditation, relaxation breathing, etc.) per day.    Status: New - Date: 10/26/2017    Intervention(s)  Therapist will provide psychoeducation, behavioral activation, and cognitive restructuring.      Objective #C  Client will exercise 30 minutes 36 times in the next 12 weeks.  Status: New - Date: 10/26/2017    Intervention(s)  Therapist will provide psychoeducation, behavioral activation, and cognitive restructuring.              Client has reviewed and agreed to the above plan.      Alexander Manley, [facility]  October 26, 2017

## 2017-10-26 NOTE — MR AVS SNAPSHOT
MRN:[id number]                      After Visit Summary   10/26/2017    Geovani Bella    MRN: [id number]           Visit Information        Provider Department      10/26/2017 1:30 PM Fco Jackson, [facility]      MyChart Information     MyChart gives you secure access to your electronic health record. If you see a primary care provider, you can also send messages to your care team and make appointments. If you have questions, please call your primary care clinic.  If you do not have a primary care provider, please call [phone] and they will assist you.        Care EveryWhere ID     This is your Care EveryWhere ID. This could be used by other organizations to access your [place] medical records  [id number]        Equal Access to Services     YARELIS DAWKINS : Bella Savage, wagunner haines, qaadrianne hathaway, ramon contreras. So [facility] [phone].    ATENCIÓN: Si habla español, tiene a ragsdale disposición servicios gratuitos de asistencia lingüística. Llame al [phone].    We comply with applicable federal civil rights laws and [place] laws. We do not discriminate on the basis of race, color, national origin, age, disability, sex, sexual orientation, or gender identity.

## 2017-11-04 ENCOUNTER — TRANSFERRED RECORDS (OUTPATIENT)
Dept: HEALTH INFORMATION MANAGEMENT | Facility: CLINIC | Age: 37
End: 2017-11-04

## 2017-11-13 ENCOUNTER — OFFICE VISIT (OUTPATIENT)
Dept: FAMILY MEDICINE | Facility: CLINIC | Age: 37
End: 2017-11-13
Payer: COMMERCIAL

## 2017-11-13 VITALS
BODY MASS INDEX: 31.25 KG/M2 | RESPIRATION RATE: 15 BRPM | SYSTOLIC BLOOD PRESSURE: 122 MMHG | OXYGEN SATURATION: 99 % | DIASTOLIC BLOOD PRESSURE: 74 MMHG | TEMPERATURE: 98.6 F | HEART RATE: 60 BPM | WEIGHT: 211 LBS | HEIGHT: 69 IN

## 2017-11-13 DIAGNOSIS — S61.411A LACERATION OF RIGHT HAND WITHOUT FOREIGN BODY, INITIAL ENCOUNTER: Primary | ICD-10-CM

## 2017-11-13 PROCEDURE — 99213 OFFICE O/P EST LOW 20 MIN: CPT | Performed by: FAMILY MEDICINE

## 2017-11-13 NOTE — NURSING NOTE
"Chief Complaint   Patient presents with     ER F/U       Initial /74 (BP Location: Left arm, Patient Position: Chair, Cuff Size: Adult Regular)  Pulse 60  Temp 98.6  F (37  C) (Oral)  Resp 15  Ht 5' 8.5\" (1.74 m)  Wt 211 lb (95.7 kg)  SpO2 99%  BMI 31.62 kg/m2 Estimated body mass index is 31.62 kg/(m^2) as calculated from the following:    Height as of this encounter: 5' 8.5\" (1.74 m).    Weight as of this encounter: 211 lb (95.7 kg).  Medication Reconciliation: complete     See Payton      "

## 2017-11-13 NOTE — MR AVS SNAPSHOT
After Visit Summary   11/13/2017    Geovani Bella    MRN: [id number]           Patient Information     Date Of Birth          1980        Visit Information        Provider Department      11/13/2017 2:00 PM Lori Sampson MD [facility]        Today's Diagnoses     Laceration of right hand without foreign body, initial encounter    -  1      Care Instructions    [facility]    If you have any questions regarding to your visit please contact your care team:       Team Red:   Clinic Hours Telephone Number   Dr. Lori Siddiqui, NP   7am-7pm  Monday - Thursday   7am-5pm  Fridays  [phone]  (Appointment scheduling available 24/7)    Questions about your visit?   Team Line  [phone]   Urgent Care - [place] and [facility] - 11am-9pm Monday-Friday Saturday-Sunday- 9am-5pm   [place] - 5pm-9pm Monday-Friday Saturday-Sunday- 9am-5pm  [phone] - [facility]  [phone] - [place]       What options do I have for visits at the clinic other than the traditional office visit?  To expand how we care for you, many of our providers are utilizing electronic visits (e-visits) and telephone visits, when medically appropriate, for interactions with their patients rather than a visit in the clinic.   We also offer nurse visits for many medical concerns. Just like any other service, we will bill your insurance company for this type of visit based on time spent on the phone with your provider. Not all insurance companies cover these visits. Please check with your medical insurance if this type of visit is covered. You will be responsible for any charges that are not paid by your insurance.      E-visits via Jobool:  generally incur a $35.00 fee.  Telephone visits:  Time spent on the phone: *charged based on time that is spent on the phone in increments of 10 minutes. Estimated cost:   5-10 mins $30.00    11-20 mins. $59.00   21-30 mins. $85.00     Use EnergyClimate Solutionshart (secure email communication and access to your chart) to send your primary care provider a message or make an appointment. Ask someone on your Team how to sign up for Clear Bookst.  For a Price Quote for your services, please call our Helpstream Line at [phone].      As always, Thank you for trusting us with your health care needs!  See Payton              Follow-ups after your visit        Follow-up notes from your care team     Return if symptoms worsen or fail to improve.      Who to contact     If you have questions or need follow up information about today's clinic visit or your schedule please contact [facility] directly at [phone].  Normal or non-critical lab and imaging results will be communicated to you by EnergyCl[employer]hart, letter or phone within 4 business days after the clinic has received the results. If you do not hear from us within 7 days, please contact the clinic through [employer]hart or phone. If you have a critical or abnormal lab result, we will notify you by phone as soon as possible.  Submit refill requests through SourceL[employer] or call your pharmacy and they will forward the refill request to us. Please allow 3 business days for your refill to be completed.          Additional Information About Your Visit        Energy[employer]hart Information     SourceLabs gives you secure access to your electronic health record. If you see a primary care provider, you can also send messages to your care team and make appointments. If you have questions, please call your primary care clinic.  If you do not have a primary care provider, please call [phone] and they will assist you.        Care EveryWhere ID     This is your Care EveryWhere ID. This could be used by other organizations to access your [place] medical records  [id number]        Your Vitals Were     Pulse Temperature Respirations Height Pulse Oximetry BMI (Body Mass Index)    60 98.6  " F (37  C) (Oral) 15 5' 8.5\" (1.74 m) 99% 31.62 kg/m2       Blood Pressure from Last 3 Encounters:   11/13/17 122/74   09/22/17 115/77   04/20/17 140/80    Weight from Last 3 Encounters:   11/13/17 211 lb (95.7 kg)   09/22/17 210 lb (95.3 kg)   05/04/17 220 lb 3.2 oz (99.9 kg)              Today, you had the following     No orders found for display       Primary Care Provider Office Phone # Fax #    Keo Phillips PA-C [phone] [address] [facility] [address]        Equal Access to Services     YARELIS DAWKINS : Bella bennetto Janette, waaxda luqadaha, qaybta kaalmada adeegyada, ramon contreras. So [facility] [phone].    ATENCIÓN: Si habla español, tiene a ragsdale disposición servicios gratuitos de asistencia lingüística. [facility] [phone].    We comply with applicable federal civil rights laws and [place] laws. We do not discriminate on the basis of race, color, national origin, age, disability, sex, sexual orientation, or gender identity.            Thank you!     Thank you for choosing [facility] [place]  for your care. Our goal is always to provide you with excellent care. Hearing back from our patients is one way we can continue to improve our services. Please take a few minutes to complete the written survey that you may receive in the mail after your visit with us. Thank you!             Your Updated Medication List - Protect others around you: Learn how to safely use, store and throw away your medicines at www.disposemymeds.org.          This list is accurate as of: 11/13/17  2:26 PM.  Always use your most recent med list.                   Brand Name Dispense Instructions for use Diagnosis    * albuterol 108 (90 BASE) MCG/ACT Inhaler    PROAIR HFA/PROVENTIL HFA/VENTOLIN HFA    1 Inhaler    Inhale 2 puffs into the lungs every 6 hours as needed for shortness of breath / dyspnea    Bronchitis with bronchospasm       * albuterol (2.5 " MG/3ML) 0.083% neb solution     25 vial    Take 1 vial (2.5 mg) by nebulization every 6 hours as needed for shortness of breath / dyspnea or wheezing    Bronchitis with bronchospasm       busPIRone 15 MG tablet    BUSPAR    180 tablet    Take 1 tablet (15 mg) by mouth 2 times daily        CLONAZEPAM PO      Take  by mouth 2 times daily.        HYDROcodone-acetaminophen 5-325 MG per tablet    NORCO    20 tablet    Take 1 tablet by mouth every 8 hours as needed for other (Moderate to Severe Pain) Maximum: 3 tablets per day.    Complex tear of medial meniscus of left knee as current injury, subsequent encounter       methylphenidate ER 27 MG CR tablet    CONCERTA     27 mg        VIAGRA 100 MG tablet   Generic drug:  sildenafil      Take 100 mg by mouth daily as needed.        * Notice:  This list has 2 medication(s) that are the same as other medications prescribed for you. Read the directions carefully, and ask your doctor or other care provider to review them with you.

## 2017-11-13 NOTE — PROGRESS NOTES
"  SUBJECTIVE:   Geovani Bella is a 37 year old male who presents to clinic today for the following health issues:    ED/UC Followup:    Facility:  [facility] [address]  Date of visit: 11/04/2017, 11/05/2017  Reason for visit: Right Hand Injury   Current Status: unchanged      Geovani Bella is a 37 year old male who presents with a left 3rd ventral laceration sustained while catching an ax. Symptom onset has been sudden, unchanged for a time period of 9 days. He was seen twice in the ED, and prescribed Augmentin for susepcted infection. He is scheduled to see hand orthopedic surgery at [facility] orthopedic surgery. He has some ongoing finger pain and is worried about crepitus in the joint. No redness or fever. Severity is described as mild. Course of his symptoms over time is unchanged.    ROS:  C: NEGATIVE for fever, chills, change in weight  INTEGUMENTARY/SKIN: as above   MUSCULOSKELETAL: as above     OBJECTIVE:     /74 (BP Location: Left arm, Patient Position: Chair, Cuff Size: Adult Regular)  Pulse 60  Temp 98.6  F (37  C) (Oral)  Resp 15  Ht 5' 8.5\" (1.74 m)  Wt 211 lb (95.7 kg)  SpO2 99%  BMI 31.62 kg/m2  Body mass index is 31.62 kg/(m^2).  GENERAL: alert and no distress  MS: No deformity; normal ROM   SKIN: laceration on left ventral proximal finger with healing; no erythema, induration or drainage   PSYCH: mentation appears normal, affect normal/bright    Diagnostic Test Results:  Results for orders placed or performed in visit on 04/28/17   US Lower Extremity Venous Duplex Left    Narrative    ULTRASOUND VENOUS LEFT LOWER EXTREMITY WITH DOPPLER   4/28/2017 2:28  PM     HISTORY: Status post left knee surgery. Left leg pain.    COMPARISON: None.    TECHNIQUE: Spectral waveform and color Doppler evaluation were  performed.    FINDINGS: Normal compressibility of the left common femoral, femoral,  popliteal and posterior tibial veins. Unremarkable Doppler waveform  evaluation of the left " common femoral, femoral and popliteal veins. A  3.8 x 3.6 x 1.2 cm heterogeneously hypoechoic structure is present in  the deep soft tissues of the posterior aspect of the left knee.      Impression    IMPRESSION:   1. No evidence of thrombus in the major veins of the left lower  extremity.  2. A 4 x 4 x 1 cm heterogeneously hypoechoic structure in the deep  soft tissues of the posterior aspect of the left knee is nonspecific,  but most likely represents a hematoma given the history of recent  surgery.    YANET BROWN MD       ASSESSMENT/PLAN:   (S61.411A) Laceration of right hand without foreign body, initial encounter  (primary encounter diagnosis)  Plan: sutures removed; daily dressings; follow-up with orthopedic surgery as scheduled       See Patient Instructions    Lori Sampson MD  [facility]

## 2017-11-13 NOTE — PATIENT INSTRUCTIONS
[facility]    If you have any questions regarding to your visit please contact your care team:       Team Red:   Clinic Hours Telephone Number   Dr. Lori Siddiqui, NP   7am-7pm  Monday - Thursday   7am-5pm  Fridays  [phone]  (Appointment scheduling available 24/7)    Questions about your visit?   Team Line  [phone]   Urgent Care - [place] - 11am-9pm Monday-Friday Saturday-Sunday- 9am-5pm   [place] - 5pm-9pm Monday-Friday Saturday-Sunday- 9am-5pm  [phone] - [place]   [phone] - [place]       What options do I have for visits at the clinic other than the traditional office visit?  To expand how we care for you, many of our providers are utilizing electronic visits (e-visits) and telephone visits, when medically appropriate, for interactions with their patients rather than a visit in the clinic.   We also offer nurse visits for many medical concerns. Just like any other service, we will bill your insurance company for this type of visit based on time spent on the phone with your provider. Not all insurance companies cover these visits. Please check with your medical insurance if this type of visit is covered. You will be responsible for any charges that are not paid by your insurance.      E-visits via [email or web address]:  generally incur a $35.00 fee.  Telephone visits:  Time spent on the phone: *charged based on time that is spent on the phone in increments of 10 minutes. Estimated cost:   5-10 mins $30.00   11-20 mins. $59.00   21-30 mins. $85.00     Use Vinculum Solutionst (secure email communication and access to your chart) to send your primary care provider a message or make an appointment. Ask someone on your Team how to sign up for [email or web address].  For a Price Quote for your services, please call our Consumer Price Line at [phone].      As always, Thank you for trusting us with your health care needs!  See ZELAYA  FLygstad

## 2017-11-14 ENCOUNTER — TRANSFERRED RECORDS (OUTPATIENT)
Dept: HEALTH INFORMATION MANAGEMENT | Facility: CLINIC | Age: 37
End: 2017-11-14

## 2017-12-29 ENCOUNTER — OFFICE VISIT (OUTPATIENT)
Dept: FAMILY MEDICINE | Facility: CLINIC | Age: 37
End: 2017-12-29
Payer: COMMERCIAL

## 2017-12-29 VITALS
WEIGHT: 210 LBS | TEMPERATURE: 97.8 F | BODY MASS INDEX: 31.1 KG/M2 | SYSTOLIC BLOOD PRESSURE: 120 MMHG | HEART RATE: 71 BPM | OXYGEN SATURATION: 98 % | DIASTOLIC BLOOD PRESSURE: 76 MMHG | HEIGHT: 69 IN

## 2017-12-29 DIAGNOSIS — H66.91 ACUTE OTITIS MEDIA, RIGHT: Primary | ICD-10-CM

## 2017-12-29 DIAGNOSIS — J20.9 BRONCHITIS WITH BRONCHOSPASM: ICD-10-CM

## 2017-12-29 PROCEDURE — 99213 OFFICE O/P EST LOW 20 MIN: CPT | Performed by: NURSE PRACTITIONER

## 2017-12-29 RX ORDER — PREDNISONE 20 MG/1
40 TABLET ORAL DAILY
Qty: 10 TABLET | Refills: 0 | Status: SHIPPED | OUTPATIENT
Start: 2017-12-29 | End: 2018-01-03

## 2017-12-29 RX ORDER — ALBUTEROL SULFATE 90 UG/1
2 AEROSOL, METERED RESPIRATORY (INHALATION) EVERY 6 HOURS PRN
Qty: 1 INHALER | Refills: 0 | Status: SHIPPED | OUTPATIENT
Start: 2017-12-29 | End: 2018-04-10

## 2017-12-29 RX ORDER — CODEINE PHOSPHATE AND GUAIFENESIN 10; 100 MG/5ML; MG/5ML
1 SOLUTION ORAL EVERY 4 HOURS PRN
Qty: 120 ML | Refills: 0 | Status: SHIPPED | OUTPATIENT
Start: 2017-12-29 | End: 2018-07-20

## 2017-12-29 RX ORDER — AMOXICILLIN 875 MG
875 TABLET ORAL 2 TIMES DAILY
Qty: 20 TABLET | Refills: 0 | Status: SHIPPED | OUTPATIENT
Start: 2017-12-29 | End: 2018-02-05

## 2017-12-29 NOTE — PATIENT INSTRUCTIONS
[facility]    If you have any questions regarding to your visit please contact your care team:       Team Red:   Clinic Hours Telephone Number   Dr. Lori Siddiqui, NP   7am-7pm  Monday - Thursday   7am-5pm  Fridays  [phone]  (Appointment scheduling available 24/7)    Questions about your visit?   Team Line  [phone]   Urgent Care - [place] and [place] - 11am-9pm Monday-Friday Saturday-Sunday- 9am-5pm   [place] - 5pm-9pm Monday-Friday Saturday-Sunday- 9am-5pm  [phone] - [place]   [phone] - [place]       What options do I have for visits at the clinic other than the traditional office visit?  To expand how we care for you, many of our providers are utilizing electronic visits (e-visits) and telephone visits, when medically appropriate, for interactions with their patients rather than a visit in the clinic.   We also offer nurse visits for many medical concerns. Just like any other service, we will bill your insurance company for this type of visit based on time spent on the phone with your provider. Not all insurance companies cover these visits. Please check with your medical insurance if this type of visit is covered. You will be responsible for any charges that are not paid by your insurance.      E-visits via Kogeto:  generally incur a $35.00 fee.  Telephone visits:  Time spent on the phone: *charged based on time that is spent on the phone in increments of 10 minutes. Estimated cost:   5-10 mins $30.00   11-20 mins. $59.00   21-30 mins. $85.00     Use fg microtect (secure email communication and access to your chart) to send your primary care provider a message or make an appointment. Ask someone on your Team how to sign up for Kogeto.  For a Price Quote for your services, please call our Consumer Price Line at [phone].      As always, Thank you for trusting us with your health care needs!  Desire BURNETT  MA
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
## 2017-12-29 NOTE — NURSING NOTE
"Chief Complaint   Patient presents with     URI     cough and wheezing, fluid in ears       Initial /76 (Cuff Size: Adult Large)  Pulse 71  Temp 97.8  F (36.6  C) (Oral)  Ht 5' 8.5\" (1.74 m)  Wt 210 lb (95.3 kg)  SpO2 98%  BMI 31.47 kg/m2 Estimated body mass index is 31.47 kg/(m^2) as calculated from the following:    Height as of this encounter: 5' 8.5\" (1.74 m).    Weight as of this encounter: 210 lb (95.3 kg).  Medication Reconciliation: complete   Rissa CURIEL CMA (AAMA)      "

## 2017-12-29 NOTE — MR AVS SNAPSHOT
After Visit Summary   12/29/2017    Geovani Bella    MRN: [id number]           Patient Information     Date Of Birth          1980        Visit Information        Provider Department      12/29/2017 1:40 PM Yasmin Siddiqui APRN [facility]        Today's Diagnoses     Acute suppurative otitis media of left ear without spontaneous rupture of tympanic membrane, recurrence not specified    -  1    Bronchitis with bronchospasm          Care Instructions    [facility]    If you have any questions regarding to your visit please contact your care team:       Team Red:   Clinic Hours Telephone Number   Dr. Lori Siddiqui, NP   7am-7pm  Monday - Thursday   7am-5pm  Fridays  [phone]  (Appointment scheduling available 24/7)    Questions about your visit?   Team Line  [phone]   Urgent Care - [facility] - 11am-9pm Monday-Friday Saturday-Sunday- 9am-5pm   [place] - 5pm-9pm Monday-Friday Saturday-Sunday- 9am-5pm  [phone] - [facility]  [phone] - [place]       What options do I have for visits at the clinic other than the traditional office visit?  To expand how we care for you, many of our providers are utilizing electronic visits (e-visits) and telephone visits, when medically appropriate, for interactions with their patients rather than a visit in the clinic.   We also offer nurse visits for many medical concerns. Just like any other service, we will bill your insurance company for this type of visit based on time spent on the phone with your provider. Not all insurance companies cover these visits. Please check with your medical insurance if this type of visit is covered. You will be responsible for any charges that are not paid by your insurance.      E-visits via [employer]:  generally incur a $35.00 fee.  Telephone visits:  Time spent on the phone: *charged based  on time that is spent on the phone in increments of 10 minutes. Estimated cost:   5-10 mins $30.00   11-20 mins. $59.00   21-30 mins. $85.00     Use [employer] (secure email communication and access to your chart) to send your primary care provider a message or make an appointment. Ask someone on your Team how to sign up for [email or web address].  For a Price Quote for your services, please call our WeVue Line at [phone].      As always, Thank you for trusting us with your health care needs!  Desire BURNETT MA            Follow-ups after your visit        Who to contact     If you have questions or need follow up information about today's clinic visit or your schedule please contact [facility] directly at [phone].  Normal or non-critical lab and imaging results will be communicated to you by [employer], letter or phone within 4 business days after the clinic has received the results. If you do not hear from us within 7 days, please contact the clinic through [email or web address] or phone. If you have a critical or abnormal lab result, we will notify you by phone as soon as possible.  Submit refill requests through Perfect Earth or call your pharmacy and they will forward the refill request to us. Please allow 3 business days for your refill to be completed.          Additional Information About Your Visit        Perfect Earth Information     Perfect Earth gives you secure access to your electronic health record. If you see a primary care provider, you can also send messages to your care team and make appointments. If you have questions, please call your primary care clinic.  If you do not have a primary care provider, please call [phone] and they will assist you.        Care EveryWhere ID     This is your Care EveryWhere ID. This could be used by other organizations to access your [place] medical records  [id number]        Your Vitals Were     Pulse Temperature Height Pulse Oximetry BMI (Body Mass Index)       71 97.8  F (36.6  " C) (Oral) 5' 8.5\" (1.74 m) 98% 31.47 kg/m2        Blood Pressure from Last 3 Encounters:   12/29/17 120/76   11/13/17 122/74   09/22/17 115/77    Weight from Last 3 Encounters:   12/29/17 210 lb (95.3 kg)   11/13/17 211 lb (95.7 kg)   09/22/17 210 lb (95.3 kg)              Today, you had the following     No orders found for display         Today's Medication Changes          These changes are accurate as of: 12/29/17  2:19 PM.  If you have any questions, ask your nurse or doctor.               Start taking these medicines.        Dose/Directions    amoxicillin 875 MG tablet   Commonly known as:  AMOXIL   Used for:  Acute suppurative otitis media of left ear without spontaneous rupture of tympanic membrane, recurrence not specified   Started by:  Yasmin Siddiqui APRN CNP        Dose:  875 mg   Take 1 tablet (875 mg) by mouth 2 times daily   Quantity:  20 tablet   Refills:  0       guaiFENesin-codeine 100-10 MG/5ML Soln solution   Commonly known as:  ROBITUSSIN AC   Used for:  Bronchitis with bronchospasm   Started by:  Yasmin Siddiqui APRN CNP        Dose:  1 tsp.   Take 5 mLs by mouth every 4 hours as needed for cough   Quantity:  120 mL   Refills:  0       predniSONE 20 MG tablet   Commonly known as:  DELTASONE   Used for:  Bronchitis with bronchospasm   Started by:  Yasmin Siddiqui APRN CNP        Dose:  40 mg   Take 2 tablets (40 mg) by mouth daily for 5 days   Quantity:  10 tablet   Refills:  0            Where to get your medicines      These medications were sent to [facility] - [place], [place] - [address] [facility]  [address] , [place] [address]    Hours:  24-hours Phone:  [phone]     albuterol 108 (90 BASE) MCG/ACT Inhaler    amoxicillin 875 MG tablet         Some of these will need a paper prescription and others can be bought over the counter.  Ask your nurse if you have questions.     Bring a paper " prescription for each of these medications     guaiFENesin-codeine 100-10 MG/5ML Soln solution    predniSONE 20 MG tablet                Primary Care Provider Office Phone # Fax #    Keo Phillips PA-C [phone]       [facility] [address]        Equal Access to Services     YARELIS DAWKINS : Hadii aad ku hadasho Soomaali, waaxda luqadaha, qaybta kaalmada adeegyada, waxay idiin hayaan adeeg khleninsh la'aan . So [facility] [phone].    ATENCIÓN: Si habla español, tiene a ragsdale disposición servicios gratuitos de asistencia lingüística. Bryson al [phone].    We comply with applicable federal civil rights laws and [place] laws. We do not discriminate on the basis of race, color, national origin, age, disability, sex, sexual orientation, or gender identity.            Thank you!     Thank you for choosing [facility]  for your care. Our goal is always to provide you with excellent care. Hearing back from our patients is one way we can continue to improve our services. Please take a few minutes to complete the written survey that you may receive in the mail after your visit with us. Thank you!             Your Updated Medication List - Protect others around you: Learn how to safely use, store and throw away your medicines at www.disposemymeds.org.          This list is accurate as of: 12/29/17  2:19 PM.  Always use your most recent med list.                   Brand Name Dispense Instructions for use Diagnosis    * albuterol (2.5 MG/3ML) 0.083% neb solution     25 vial    Take 1 vial (2.5 mg) by nebulization every 6 hours as needed for shortness of breath / dyspnea or wheezing    Bronchitis with bronchospasm       * albuterol 108 (90 BASE) MCG/ACT Inhaler    PROAIR HFA/PROVENTIL HFA/VENTOLIN HFA    1 Inhaler    Inhale 2 puffs into the lungs every 6 hours as needed for shortness of breath / dyspnea    Bronchitis with bronchospasm       amoxicillin 875 MG tablet    AMOXIL     20 tablet    Take 1 tablet (875 mg) by mouth 2 times daily    Acute suppurative otitis media of left ear without spontaneous rupture of tympanic membrane, recurrence not specified       busPIRone 15 MG tablet    BUSPAR    180 tablet    Take 1 tablet (15 mg) by mouth 2 times daily        CLONAZEPAM PO      Take  by mouth 2 times daily.        guaiFENesin-codeine 100-10 MG/5ML Soln solution    ROBITUSSIN AC    120 mL    Take 5 mLs by mouth every 4 hours as needed for cough    Bronchitis with bronchospasm       methylphenidate ER 27 MG CR tablet    CONCERTA     27 mg        predniSONE 20 MG tablet    DELTASONE    10 tablet    Take 2 tablets (40 mg) by mouth daily for 5 days    Bronchitis with bronchospasm       VIAGRA 100 MG tablet   Generic drug:  sildenafil      Take 100 mg by mouth daily as needed.        * Notice:  This list has 2 medication(s) that are the same as other medications prescribed for you. Read the directions carefully, and ask your doctor or other care provider to review them with you.

## 2018-02-05 ENCOUNTER — OFFICE VISIT (OUTPATIENT)
Dept: FAMILY MEDICINE | Facility: CLINIC | Age: 38
End: 2018-02-05
Payer: COMMERCIAL

## 2018-02-05 VITALS
HEART RATE: 88 BPM | TEMPERATURE: 98.4 F | HEIGHT: 69 IN | SYSTOLIC BLOOD PRESSURE: 133 MMHG | OXYGEN SATURATION: 100 % | BODY MASS INDEX: 31.7 KG/M2 | WEIGHT: 214 LBS | DIASTOLIC BLOOD PRESSURE: 82 MMHG

## 2018-02-05 DIAGNOSIS — R10.12 LUQ ABDOMINAL PAIN: Primary | ICD-10-CM

## 2018-02-05 DIAGNOSIS — F17.200 TOBACCO USE DISORDER: ICD-10-CM

## 2018-02-05 DIAGNOSIS — F32.1 MODERATE MAJOR DEPRESSION (H): ICD-10-CM

## 2018-02-05 DIAGNOSIS — F43.23 ADJUSTMENT DISORDER WITH MIXED ANXIETY AND DEPRESSED MOOD: ICD-10-CM

## 2018-02-05 LAB
ERYTHROCYTE [DISTWIDTH] IN BLOOD BY AUTOMATED COUNT: 14 % (ref 10–15)
HCT VFR BLD AUTO: 47 % (ref 40–53)
HGB BLD-MCNC: 16.3 G/DL (ref 13.3–17.7)
MCH RBC QN AUTO: 30.1 PG (ref 26.5–33)
MCHC RBC AUTO-ENTMCNC: 34.7 G/DL (ref 31.5–36.5)
MCV RBC AUTO: 87 FL (ref 78–100)
PLATELET # BLD AUTO: 258 10E9/L (ref 150–450)
RBC # BLD AUTO: 5.42 10E12/L (ref 4.4–5.9)
WBC # BLD AUTO: 7.6 10E9/L (ref 4–11)

## 2018-02-05 PROCEDURE — 36415 COLL VENOUS BLD VENIPUNCTURE: CPT | Performed by: PHYSICIAN ASSISTANT

## 2018-02-05 PROCEDURE — 80076 HEPATIC FUNCTION PANEL: CPT | Performed by: PHYSICIAN ASSISTANT

## 2018-02-05 PROCEDURE — 85027 COMPLETE CBC AUTOMATED: CPT | Performed by: PHYSICIAN ASSISTANT

## 2018-02-05 PROCEDURE — 99214 OFFICE O/P EST MOD 30 MIN: CPT | Performed by: PHYSICIAN ASSISTANT

## 2018-02-05 RX ORDER — NICOTINE 21 MG/24HR
1 PATCH, TRANSDERMAL 24 HOURS TRANSDERMAL EVERY 24 HOURS
Qty: 30 PATCH | Refills: 0 | Status: SHIPPED | OUTPATIENT
Start: 2018-02-05 | End: 2018-03-11

## 2018-02-05 ASSESSMENT — ANXIETY QUESTIONNAIRES
GAD7 TOTAL SCORE: 21
6. BECOMING EASILY ANNOYED OR IRRITABLE: NEARLY EVERY DAY
2. NOT BEING ABLE TO STOP OR CONTROL WORRYING: NEARLY EVERY DAY
5. BEING SO RESTLESS THAT IT IS HARD TO SIT STILL: NEARLY EVERY DAY
7. FEELING AFRAID AS IF SOMETHING AWFUL MIGHT HAPPEN: NEARLY EVERY DAY
1. FEELING NERVOUS, ANXIOUS, OR ON EDGE: NEARLY EVERY DAY
3. WORRYING TOO MUCH ABOUT DIFFERENT THINGS: NEARLY EVERY DAY

## 2018-02-05 ASSESSMENT — PATIENT HEALTH QUESTIONNAIRE - PHQ9: 5. POOR APPETITE OR OVEREATING: NEARLY EVERY DAY

## 2018-02-05 NOTE — PROGRESS NOTES
SUBJECTIVE:   Geovani Bella is a 37 year old male who presents to clinic today for the following health issues:    Depression Followup    Status since last visit: Stable     See PHQ-9 for current symptoms.  Other associated symptoms: anxiety    Complicating factors:   Significant life event:  Yes-  Father passed away, mother with health issues    Current substance abuse:  None  Anxiety or Panic symptoms:  Yes     PHQ-9 3/27/2017 9/28/2017   Total Score 24 11   Q9: Suicide Ideation Several days Not at all     PHQ-9  English  PHQ-9   Any Language  Suicide Assessment Five-step Evaluation and Treatment (SAFE-T)    Amount of exercise or physical activity: None    Problems taking medications regularly: No    Medication side effects: sexual side effects     Diet: regular (no restrictions)      ABDOMINAL PAIN     Onset: 1 week    Description:   Character: Sharp  Location: left upper quadrant  Radiation: None    Intensity: moderate    Progression of Symptoms:  intermittent    Accompanying Signs & Symptoms:  Fever/Chills?: no   Gas/Bloating: YES- lots of gas   Nausea: YES- some   Vomitting: no   Diarrhea?: YES- some   Constipation:no   Dysuria or Hematuria: no    History:   Trauma: no   Previous similar pain: no    Previous tests done: none    Precipitating factors:   Does the pain change with:     Food: no      BM: no     Urination: no     Alleviating factors:  Unknown     Therapies Tried and outcome: none    LMP:  not applicable       Stabbing / sharp left upper abdominal pain x 1 week - pain feels steady / lasts 5-7 seconds. No other symptoms related to bowels, no diarrhea/nausea or vomiting.     Problem list and histories reviewed & adjusted, as indicated.  Additional history: as documented    Labs reviewed in EPIC    Reviewed and updated as needed this visit by clinical staff  Tobacco  Allergies  Meds  Med Hx  Surg Hx  Fam Hx  Soc Hx      Reviewed and updated as needed this visit by Provider      "    ROS:  Constitutional, HEENT, cardiovascular, pulmonary, gi and gu systems are negative, except as otherwise noted.    OBJECTIVE:     /82  Pulse 88  Temp 98.4  F (36.9  C) (Oral)  Ht 5' 8.5\" (1.74 m)  Wt 214 lb (97.1 kg)  SpO2 100%  BMI 32.07 kg/m2  Body mass index is 32.07 kg/(m^2).  GENERAL: healthy, alert and no distress  HENT: ear canals and TM's normal, nose and mouth without ulcers or lesions  NECK: no adenopathy, no asymmetry, masses, or scars and thyroid normal to palpation  RESP: lungs clear to auscultation - no rales, rhonchi or wheezes  CV: regular rate and rhythm, normal S1 S2, no S3 or S4, no murmur, click or rub, no peripheral edema and peripheral pulses strong  ABDOMEN: soft, nontender, no hepatosplenomegaly, no masses and bowel sounds normal  MS: no gross musculoskeletal defects noted, no edema  NEURO: Normal strength and tone, mentation intact and speech normal  PSYCH: mentation appears normal, affect normal/bright    Diagnostic Test Results:  none     ASSESSMENT/PLAN:     (R10.12) LUQ abdominal pain  (primary encounter diagnosis)  Comment:   Plan: omeprazole (PRILOSEC) 20 MG CR capsule, Hepatic        panel (Albumin, ALT, AST, Bili, Alk Phos, TP),         CBC with platelets, H Pylori antigen, stool        Exam reassuring and fairly normal. Reviewed options. His father just passed away so he likely has a lot of stress. Symptoms seem focal to the stomach area - I wonder if perhaps this is just GI / gastritic. Recommend PPI for a week or two. Warning symptoms of worsening condition discussed and patient shows good understanding.     (F43.23) Adjustment disorder with mixed anxiety and depressed mood  Comment:   Plan: As noted - reassurance and counseling given. He's doing okay.     (F32.1) Moderate major depression (H)  Comment:   Plan: Stable otherwise     (F17.200) Tobacco use disorder  Comment:   Plan: nicotine (NICODERM CQ) 14 MG/24HR 24 hr patch,         nicotine (NICODERM CQ) 7 " MG/24HR 24 hr patch        Requesting assistance to quit smoking. Wants to do patch. This prescription is given with a discussion of side effects, risks and proper use.  Instructions are given to follow up if not improving or symptoms change or worsen as discussed.     TONIA NIELSEN PA-C  [facility]

## 2018-02-05 NOTE — MR AVS SNAPSHOT
After Visit Summary   2/5/2018    Geovani Bella    MRN: [id number]           Patient Information     Date Of Birth          1980        Visit Information        Provider Department      2/5/2018 2:00 PM Keo Phillips PA-C [facility]        Today's Diagnoses     LUQ abdominal pain    -  1    Adjustment disorder with mixed anxiety and depressed mood        Moderate major depression (H)        Tobacco use disorder           Follow-ups after your visit        Future tests that were ordered for you today     Open Future Orders        Priority Expected Expires Ordered    H Pylori antigen, stool Routine  3/7/2018 2/5/2018            Who to contact     If you have questions or need follow up information about today's clinic visit or your schedule please contact [facility] directly at [phone].  Normal or non-critical lab and imaging results will be communicated to you by MyChart, letter or phone within 4 business days after the clinic has received the results. If you do not hear from us within 7 days, please contact the clinic through MyChart or phone. If you have a critical or abnormal lab result, we will notify you by phone as soon as possible.  Submit refill requests through [employer] or call your pharmacy and they will forward the refill request to us. Please allow 3 business days for your refill to be completed.          Additional Information About Your Visit        MyChart Information     [employer] gives you secure access to your electronic health record. If you see a primary care provider, you can also send messages to your care team and make appointments. If you have questions, please call your primary care clinic.  If you do not have a primary care provider, please call [phone] and they will assist you.        Care EveryWhere ID     This is your Care EveryWhere ID. This could be used by other organizations to access your [facility]  "records  [id number]        Your Vitals Were     Pulse Temperature Height Pulse Oximetry BMI (Body Mass Index)       88 98.4  F (36.9  C) (Oral) 5' 8.5\" (1.74 m) 100% 32.07 kg/m2        Blood Pressure from Last 3 Encounters:   02/05/18 133/82   12/29/17 120/76   11/13/17 122/74    Weight from Last 3 Encounters:   02/05/18 214 lb (97.1 kg)   12/29/17 210 lb (95.3 kg)   11/13/17 211 lb (95.7 kg)              We Performed the Following     CBC with platelets     Hepatic panel (Albumin, ALT, AST, Bili, Alk Phos, TP)          Today's Medication Changes          These changes are accurate as of 2/5/18  2:41 PM.  If you have any questions, ask your nurse or doctor.               Start taking these medicines.        Dose/Directions    * nicotine 14 MG/24HR 24 hr patch   Commonly known as:  NICODERM CQ   Used for:  Tobacco use disorder   Started by:  Keo Phillips PA-C        Dose:  1 patch   Place 1 patch onto the skin every 24 hours   Quantity:  30 patch   Refills:  0       * nicotine 7 MG/24HR 24 hr patch   Commonly known as:  NICODERM CQ   Used for:  Tobacco use disorder   Started by:  Keo Phillips PA-C        Dose:  1 patch   Place 1 patch onto the skin every 24 hours   Quantity:  30 patch   Refills:  0       omeprazole 20 MG CR capsule   Commonly known as:  priLOSEC   Used for:  LUQ abdominal pain   Started by:  Keo Phillips PA-C        Dose:  20 mg   Take 1 capsule (20 mg) by mouth daily   Quantity:  30 capsule   Refills:  0       * Notice:  This list has 2 medication(s) that are the same as other medications prescribed for you. Read the directions carefully, and ask your doctor or other care provider to review them with you.         Where to get your medicines      These medications were sent to [facility] - [place], [place] - [address] [facility] [address]  [address] [facility] [facility] [address]    Hours:  24-hours Phone:  [phone]     " nicotine 14 MG/24HR 24 hr patch    nicotine 7 MG/24HR 24 hr patch    omeprazole 20 MG CR capsule                Primary Care Provider Office Phone # Fax #    Keo Phillips PA-C [phone] [address] [facility] [address]        Equal Access to Services     YARELIS DAWKINS : Hadii aad ku hadasho Soomaali, waaxda luqadaha, qaybta kaalmada adeegyada, waxay idiin hayaan adeeg jorge larosan ah. So [place] -[phone].    ATENCIÓN: Si habla español, tiene a ragsdale disposición servicios gratuitos de asistencia lingüística. Llame al [phone].    We comply with applicable federal civil rights laws and [place] laws. We do not discriminate on the basis of race, color, national origin, age, disability, sex, sexual orientation, or gender identity.            Thank you!     Thank you for choosing [facility]  for your care. Our goal is always to provide you with excellent care. Hearing back from our patients is one way we can continue to improve our services. Please take a few minutes to complete the written survey that you may receive in the mail after your visit with us. Thank you!             Your Updated Medication List - Protect others around you: Learn how to safely use, store and throw away your medicines at www.disposemymeds.org.          This list is accurate as of 2/5/18  2:41 PM.  Always use your most recent med list.                   Brand Name Dispense Instructions for use Diagnosis    * albuterol (2.5 MG/3ML) 0.083% neb solution     25 vial    Take 1 vial (2.5 mg) by nebulization every 6 hours as needed for shortness of breath / dyspnea or wheezing    Bronchitis with bronchospasm       * albuterol 108 (90 BASE) MCG/ACT Inhaler    PROAIR HFA/PROVENTIL HFA/VENTOLIN HFA    1 Inhaler    Inhale 2 puffs into the lungs every 6 hours as needed for shortness of breath / dyspnea    Bronchitis with bronchospasm       busPIRone 15 MG tablet    BUSPAR    180 tablet    Take 1  tablet (15 mg) by mouth 2 times daily        CLONAZEPAM PO      Take  by mouth 2 times daily.        guaiFENesin-codeine 100-10 MG/5ML Soln solution    ROBITUSSIN AC    120 mL    Take 5 mLs by mouth every 4 hours as needed for cough    Bronchitis with bronchospasm       methylphenidate ER 27 MG CR tablet    CONCERTA     27 mg        * nicotine 14 MG/24HR 24 hr patch    NICODERM CQ    30 patch    Place 1 patch onto the skin every 24 hours    Tobacco use disorder       * nicotine 7 MG/24HR 24 hr patch    NICODERM CQ    30 patch    Place 1 patch onto the skin every 24 hours    Tobacco use disorder       omeprazole 20 MG CR capsule    priLOSEC    30 capsule    Take 1 capsule (20 mg) by mouth daily    LUQ abdominal pain       VIAGRA 100 MG tablet   Generic drug:  sildenafil      Take 100 mg by mouth daily as needed.        * Notice:  This list has 4 medication(s) that are the same as other medications prescribed for you. Read the directions carefully, and ask your doctor or other care provider to review them with you.

## 2018-02-06 LAB
ALBUMIN SERPL-MCNC: 4.2 G/DL (ref 3.4–5)
ALP SERPL-CCNC: 54 U/L (ref 40–150)
ALT SERPL W P-5'-P-CCNC: 37 U/L (ref 0–70)
AST SERPL W P-5'-P-CCNC: 16 U/L (ref 0–45)
BILIRUB DIRECT SERPL-MCNC: 0.1 MG/DL (ref 0–0.2)
BILIRUB SERPL-MCNC: 0.8 MG/DL (ref 0.2–1.3)
PROT SERPL-MCNC: 7.4 G/DL (ref 6.8–8.8)

## 2018-02-06 ASSESSMENT — ANXIETY QUESTIONNAIRES: GAD7 TOTAL SCORE: 21

## 2018-02-06 ASSESSMENT — PATIENT HEALTH QUESTIONNAIRE - PHQ9: SUM OF ALL RESPONSES TO PHQ QUESTIONS 1-9: 20

## 2018-03-11 DIAGNOSIS — F17.200 TOBACCO USE DISORDER: ICD-10-CM

## 2018-03-12 NOTE — TELEPHONE ENCOUNTER
"Requested Prescriptions   Pending Prescriptions Disp Refills     NICOTINE STEP 3 7 MG/24HR 24 hr patch [Pharmacy Med Name: NICOTINE 7MG/24H PATCH]  Last Written Prescription Date:  2/5/2018  Last Fill Quantity: 30 patch,  # refills: 0   Last office visit: 2/5/2018 with prescribing provider:  BEBE Phillips   Future Office Visit:     28 patch 0     Sig: APPLY 1 PATCH EXTERNALLY TO THE SKIN EVERY 24 HOURS    Partial Cholinergic Nicotinic Agonist Agents Passed    3/11/2018 12:38 PM       Passed - Blood pressure under 140/90 in past 12 months    BP Readings from Last 3 Encounters:   02/05/18 133/82   12/29/17 120/76   11/13/17 122/74          Passed - Recent (12 mo) or future (30 days) visit within the authorizing provider's specialty    Patient had office visit in the last 12 months or has a visit in the next 30 days with authorizing provider or within the authorizing provider's specialty.  See \"Patient Info\" tab in [employer], or \"Choose Columns\" in Meds & Orders section of the refill encounter.           Passed - Patient is 18 years of age or older                NICOTINE STEP 2 14 MG/24HR 24 hr patch [Pharmacy Med Name: NICOTINE 14MG/24H PATCH]  Last Written Prescription Date:  2/5/2018  Last Fill Quantity: 30 patch,  # refills: 0   Last office visit: 2/5/2018 with prescribing provider:  BEBE Phillips   Future Office Visit:     28 patch 0     Sig: APPLY ONE PATCH ONTO THE SKIN EVERY 24 HOURS    Partial Cholinergic Nicotinic Agonist Agents Passed    3/11/2018 12:38 PM       Passed - Blood pressure under 140/90 in past 12 months    BP Readings from Last 3 Encounters:   02/05/18 133/82   12/29/17 120/76   11/13/17 122/74                Passed - Recent (12 mo) or future (30 days) visit within the authorizing provider's specialty    Patient had office visit in the last 12 months or has a visit in the next 30 days with authorizing provider or within the authorizing provider's specialty.  See \"Patient Info\" tab in [employer]ang[employer], or " "\"Choose Columns\" in Meds & Orders section of the refill encounter.           Passed - Patient is 18 years of age or older          "

## 2018-03-13 RX ORDER — MELATONIN/LEMON BALM LEAF EXTR 5MG-500MCG
TABLET ORAL
Qty: 28 PATCH | Refills: 1 | Status: SHIPPED | OUTPATIENT
Start: 2018-03-13 | End: 2018-11-15

## 2018-03-13 NOTE — TELEPHONE ENCOUNTER
Prescription approved per [facility] Refill Protocol.  Isidra Porter RN

## 2018-04-10 DIAGNOSIS — J20.9 BRONCHITIS WITH BRONCHOSPASM: ICD-10-CM

## 2018-04-10 RX ORDER — ALBUTEROL SULFATE 90 UG/1
AEROSOL, METERED RESPIRATORY (INHALATION)
Qty: 18 G | Refills: 0 | OUTPATIENT
Start: 2018-04-10

## 2018-04-10 NOTE — TELEPHONE ENCOUNTER
Patient prescribed ventolin inhaler for diagnosis of bronchitis with bronchospasm 12/29/17. Patient does not have diagnosis of asthma.     Discussed with patient, who confirms that he does not need refills on this medication and he states that he tried to straighten this out with his pharmacy as well. Medication completed from medication list as therapy completed.     Essie Ferraro RN on 4/10/2018 at 6:03 PM

## 2018-04-17 ENCOUNTER — TELEPHONE (OUTPATIENT)
Dept: FAMILY MEDICINE | Facility: CLINIC | Age: 38
End: 2018-04-17

## 2018-04-17 NOTE — TELEPHONE ENCOUNTER
Reason for Call:  Other question    Detailed comments: pt is wondering who Keo Phillips would recommend for family consulting     Phone Number Patient can be reached at: Home number on file [phone] (home)    Best Time:     Can we leave a detailed message on this number? YES    Call taken on 4/17/2018 at 3:19 PM by Leticia Garcia

## 2018-04-17 NOTE — TELEPHONE ENCOUNTER
Route to PCP to advise, patient is looking for recommendations on family counseling. Called and left VM with patient to let him know PCP is out this week and we will call him back next week.     Thanks,  Steve Carbajal RN

## 2018-04-23 NOTE — TELEPHONE ENCOUNTER
I am assuming this message is actually meant for the mental health challenges of his mother and not for Geovani specifically?     Family therapy can be with any family therapist or therapist that does family therapy.     Let me know if this message is meant for his mother. His mother is seeing a really excellent therapist who can offer up family related recommendations.    Keo Phillips, ERNSTS, PA-C

## 2018-04-23 NOTE — TELEPHONE ENCOUNTER
There is another encounter open with an ongoing discussion regarding patient's mental health, etc.  Patient and Shaheed have an appt with PCP today, so close this encounter.    Kamilah Gates RN

## 2018-04-26 ENCOUNTER — TELEPHONE (OUTPATIENT)
Dept: FAMILY MEDICINE | Facility: CLINIC | Age: 38
End: 2018-04-26

## 2018-04-26 NOTE — TELEPHONE ENCOUNTER
Reason for Call:  Other     Detailed comments: patient is calling to let Marciano Phillips know that he missed today's appointment because he thought the time of the appointment was 11:20 instead of 10:20.  No problems at home and everything is ok.  Patient just has not had time for his own appointments. Everything ok and he does not need anyone to call him.  Just wanted to give Marciano Phillips an FYI.    Phone Number Patient can be reached at: Home number on file [phone] (home)    Best Time: any    Can we leave a detailed message on this number? YES    Call taken on 4/26/2018 at 10:29 AM by Faviola Isaac

## 2018-05-24 ENCOUNTER — OFFICE VISIT (OUTPATIENT)
Dept: BEHAVIORAL HEALTH | Facility: CLINIC | Age: 38
End: 2018-05-24
Payer: COMMERCIAL

## 2018-05-24 DIAGNOSIS — F32.1 MODERATE MAJOR DEPRESSION (H): Primary | ICD-10-CM

## 2018-05-24 PROCEDURE — 90834 PSYTX W PT 45 MINUTES: CPT | Performed by: SOCIAL WORKER

## 2018-05-24 NOTE — MR AVS SNAPSHOT
MRN:[id number]                      After Visit Summary   5/24/2018    Geovani Bella    MRN: [id number]           Visit Information        Provider Department      5/24/2018 8:30 AM Fco Jackson, [facility] Generic      Your next 10 appointments already scheduled     Jun 01, 2018  2:30 PM CDT   Return Visit with Fco Jackson [facility] ([address] [id number] [phone]            Jun 08, 2018  9:30 AM CDT   Return Visit with Fco Jackson [facility] ([facility])    [address] [place] [id number] [phone]              MyChart Information     [employer] gives you secure access to your electronic health record. If you see a primary care provider, you can also send messages to your care team and make appointments. If you have questions, please call your primary care clinic.  If you do not have a primary care provider, please call [phone] and they will assist you.        Care EveryWhere ID     This is your Care EveryWhere ID. This could be used by other organizations to access your [place] medical records  [id number]        Equal Access to Services     YARELIS DAWKINS : Bella Savage, waaxda lusebastianadaha, qaybta kaalmada rivas, ramon contreras. So [facility] [phone].    ATENCIÓN: Si habla español, tiene a ragsdale disposición servicios gratuitos de asistencia lingüística. Llame al [phone].    We comply with applicable federal civil rights laws and [place] laws. We do not discriminate on the basis of race, color, national origin, age, disability, sex, sexual orientation, or gender identity.

## 2018-06-01 ENCOUNTER — OFFICE VISIT (OUTPATIENT)
Dept: BEHAVIORAL HEALTH | Facility: CLINIC | Age: 38
End: 2018-06-01
Payer: COMMERCIAL

## 2018-06-01 DIAGNOSIS — F32.1 MODERATE MAJOR DEPRESSION (H): Primary | ICD-10-CM

## 2018-06-01 PROCEDURE — 90834 PSYTX W PT 45 MINUTES: CPT | Performed by: SOCIAL WORKER

## 2018-06-01 NOTE — MR AVS SNAPSHOT
MRN:[id number]                      After Visit Summary   6/1/2018    Geovani Bella    MRN: [id number]           Visit Information        Provider Department      6/1/2018 2:30 PM Fco Jackson, [facility] Generic      Your next 10 appointments already scheduled     Jun 08, 2018  9:30 AM CDT   Return Visit with Fco Jackson [facility] ([address] [phone]            Yong 15, 2018 12:30 PM CDT   Return Visit with Fco Jackson [facility] ([facility])    [address] [place] [id number] [phone]              MyChart Information     [employer] gives you secure access to your electronic health record. If you see a primary care provider, you can also send messages to your care team and make appointments. If you have questions, please call your primary care clinic.  If you do not have a primary care provider, please call [phone] and they will assist you.        Care EveryWhere ID     This is your Care EveryWhere ID. This could be used by other organizations to access your [place] medical records  [id number]        Equal Access to Services     YARELIS DAWKINS : Bella Savage, waaxda luchitra, qaybta kaalmada rivas, ramon contreras. So [facility] [phone].    ATENCIÓN: Si habla español, tiene a ragsdale disposición servicios gratuitos de asistencia lingüística. Llame al [phone].    We comply with applicable federal civil rights laws and [place] laws. We do not discriminate on the basis of race, color, national origin, age, disability, sex, sexual orientation, or gender identity.

## 2018-06-01 NOTE — PROGRESS NOTES
"                                             Progress Note    Client Name: Geovani Bella  Date: 6/1/2018          Service Type: Individual      Session Start Time: 230  Session End Time: 315      Session Length: 38-52     Session #: 2     Attendees: Client attended alone    Treatment Plan Last Reviewed: 5/24/2018   PHQ-9 / DAVONTE-7 :      DATA      Progress Since Last Session (Related to Symptoms / Goals / Homework):   Symptoms: Stable    Homework: Partially completed      Episode of Care Goals: Satisfactory progress - PREPARATION (Decided to change - considering how); Intervened by negotiating a change plan and determining options / strategies for behavior change, identifying triggers, exploring social supports, and working towards setting a date to begin behavior change     Current / Ongoing Stressors and Concerns:     Client notes that \"things have been alright.\"  Stress continues with family, has not heard from his sister for weeks.  Discussed productive and unproductive worry.  Framed productive worry as prudent, focused on events or problems, might reasonably cause a problem if left unattended, and that lead to a potential solution of a problem.  Explained productive worry as a \"to do\" list and unproductive worry as a \"what if\" list.  Used metaphor of car trip to describe the differences.  Explored how client has distinguish between the two in the past and might do this better in the future.  Discussed logging productive and unproductive worry between sessions.                Treatment Objective(s) Addressed in This Session:    Client will use identified behavioral and cognitive skills to challenge negative self talk 90% of the time.     Intervention:   CBT: - Discussed cognitive restructuring and behavioral activation.  Explored the connection between thoughts, feelings, and actions by using examples relative to client's presenting concerns.  Explained major domains of symptoms (cognitive, emotional, " somatic, behavioral, interpersonal) and importance of targeted and specific interventions to reduce symptoms of anxiety and depression.  Discussed role of symptom monitoring in cognitive behavioral treatment.          ASSESSMENT: Current Emotional / Mental Status (status of significant symptoms):   Risk status (Self / Other harm or suicidal ideation)   Client denies current fears or concerns for personal safety.   Client denies current or recent suicidal ideation or behaviors.   Client denies current or recent homicidal ideation or behaviors.   Client denies current or recent self injurious behavior or ideation.   Client denies other safety concerns.   A safety and risk management plan has not been developed at this time, however client was given the after-hours number / 911 should there be a change in any of these risk factors.     Appearance:   Appropriate    Eye Contact:   Good    Psychomotor Behavior: Normal    Attitude:   Cooperative    Orientation:   All   Speech    Rate / Production: Normal     Volume:  Normal    Mood:    Anxious    Affect:    Appropriate    Thought Content:  Clear    Thought Form:  Coherent  Logical    Insight:    Good      Medication Review:   No changes to current psychiatric medication(s)     Medication Compliance:   Yes     Changes in Health Issues:   None reported     Chemical Use Review:   Substance Use: Chemical use reviewed, no active concerns identified      Tobacco Use: No change in amount of tobacco use since last session.  Provided encouragement to quit      Collateral Reports Completed:   Not Applicable    PLAN: (Client Tasks / Therapist Tasks / Other)  1.  Meet in a week  2.  More CBT at next session        BANDAR Shaw                                                         ________________________________________________________________________    Treatment Plan    Client's Name: Geovani Bella  YOB: 1980    Date: 10/26/2017     .DSM5  Diagnoses: (Sustained by DSM5 Criteria Listed Above)  Diagnoses: 296.32 (F33.1) Major Depressive Disorder, Recurrent Episode, Moderate _  300.02 (F41.1) Generalized Anxiety Disorder  300.3 (F42) Obsessive Compulsive Disorder  309.81 (F43.10) Posttraumatic Stress Disorder (includes Posttraumatic Stress Disorder for Children 6 Years and Younger)  Without dissociative symptoms  Psychosocial & Contextual Factors: stress with girlfriend  WHODAS 2.0 (12 item)            This questionnaire asks about difficulties due to health conditions. Health conditions  include  disease or illnesses, other health problems that may be short or long lasting,  injuries, mental health or emotional problems, and problems with alcohol or drugs.                     Think back over the past 30 days and answer these questions, thinking about how much  difficulty you had doing the following activities. For each question, please [place] only  one response.    S1 Standing for long periods such as 30 minutes? Mild =           2   S2 Taking care of household responsibilities? None =         1   S3 Learning a new task, for example, learning how to get to a new place? Mild =           2   S4 How much of a problem do you have joining community activities (for example, festivals, Spiritism or other activities) in the same way as anyone else can? Moderate =   3   S5 How much have you been emotionally affected by your health problems? Moderate =   3     In the past 30 days, how much difficulty did you have in:   S6 Concentrating on doing something for ten minutes? Moderate =   3   S7 Walking a long distance such as a kilometer (or equivalent)? None =         1   S8 Washing your whole body? None =         1   S9 Getting dressed? None =         1   S10 Dealing with people you do not know? None =         1   S11 Maintaining a friendship? Mild =           2   S12 Your day to day work? Mild =           2     H1 Overall, in the past 30 days, how many days were  these difficulties present? Record number of days 30   H2 In the past 30 days, for how many days were you totally unable to carry out your usual activities or work because of any health condition? Record number of days  0   H3 In the past 30 days, not counting the days that you were totally unable, for how many days did you cut back or reduce your usual activities or work because of any health condition? Record number of days 10       Referral / Collaboration:  Referral to another professional/service is not indicated at this time..    Anticipated number of session or this episode of care: 24-26      MeasurableTreatment Goal(s) related to diagnosis / functional impairment(s)  Goal-Depression: Client will decrease depressed mood    I will know I've met my goal when I am less depressed.      Objective #A (Client Action)    Status: New - Date: 5/24/2018     Client will use identified behavioral and cognitive skills to challenge negative self talk 90% of the time.    Intervention(s)  Therapist will provide psychoeducation, behavioral activation, and cognitive restructuring.      Objective #B  Client will complete at least 10 minutes of self-regulation practice (e.g.: yoga, meditation, relaxation breathing, etc.) per day.    Status: New - Date: 5/24/2018     Intervention(s)  Therapist will provide psychoeducation, behavioral activation, and cognitive restructuring.      Objective #C  Client will exercise 30 minutes 36 times in the next 12 weeks.  Status: New - Date: 5/24/2018     Intervention(s)  Therapist will provide psychoeducation, behavioral activation, and cognitive restructuring.              Client has reviewed and agreed to the above plan.      Alexander Manley, [facility] Center[facility]  October 26, 2017

## 2018-07-20 ENCOUNTER — OFFICE VISIT (OUTPATIENT)
Dept: FAMILY MEDICINE | Facility: CLINIC | Age: 38
End: 2018-07-20
Payer: COMMERCIAL

## 2018-07-20 VITALS
HEART RATE: 68 BPM | WEIGHT: 215 LBS | TEMPERATURE: 98.5 F | SYSTOLIC BLOOD PRESSURE: 128 MMHG | HEIGHT: 69 IN | DIASTOLIC BLOOD PRESSURE: 78 MMHG | BODY MASS INDEX: 31.84 KG/M2

## 2018-07-20 DIAGNOSIS — M25.551 HIP PAIN, BILATERAL: ICD-10-CM

## 2018-07-20 DIAGNOSIS — M25.552 HIP PAIN, BILATERAL: ICD-10-CM

## 2018-07-20 DIAGNOSIS — R22.1 MASS OF NECK: Primary | ICD-10-CM

## 2018-07-20 DIAGNOSIS — M79.675 PAIN OF TOE OF LEFT FOOT: ICD-10-CM

## 2018-07-20 DIAGNOSIS — Z11.3 SCREEN FOR STD (SEXUALLY TRANSMITTED DISEASE): ICD-10-CM

## 2018-07-20 LAB
CRP SERPL-MCNC: <2.9 MG/L (ref 0–8)
ERYTHROCYTE [SEDIMENTATION RATE] IN BLOOD BY WESTERGREN METHOD: 4 MM/H (ref 0–15)

## 2018-07-20 PROCEDURE — 87591 N.GONORRHOEAE DNA AMP PROB: CPT | Performed by: PHYSICIAN ASSISTANT

## 2018-07-20 PROCEDURE — 86803 HEPATITIS C AB TEST: CPT | Performed by: PHYSICIAN ASSISTANT

## 2018-07-20 PROCEDURE — 86140 C-REACTIVE PROTEIN: CPT | Performed by: PHYSICIAN ASSISTANT

## 2018-07-20 PROCEDURE — 87491 CHLMYD TRACH DNA AMP PROBE: CPT | Performed by: PHYSICIAN ASSISTANT

## 2018-07-20 PROCEDURE — 85652 RBC SED RATE AUTOMATED: CPT | Performed by: PHYSICIAN ASSISTANT

## 2018-07-20 PROCEDURE — 86431 RHEUMATOID FACTOR QUANT: CPT | Performed by: PHYSICIAN ASSISTANT

## 2018-07-20 PROCEDURE — 84550 ASSAY OF BLOOD/URIC ACID: CPT | Performed by: PHYSICIAN ASSISTANT

## 2018-07-20 PROCEDURE — 99214 OFFICE O/P EST MOD 30 MIN: CPT | Performed by: PHYSICIAN ASSISTANT

## 2018-07-20 PROCEDURE — 36415 COLL VENOUS BLD VENIPUNCTURE: CPT | Performed by: PHYSICIAN ASSISTANT

## 2018-07-20 PROCEDURE — 87389 HIV-1 AG W/HIV-1&-2 AB AG IA: CPT | Performed by: PHYSICIAN ASSISTANT

## 2018-07-20 RX ORDER — INDOMETHACIN 50 MG/1
50 CAPSULE ORAL 2 TIMES DAILY WITH MEALS
Qty: 42 CAPSULE | Refills: 1 | Status: SHIPPED | OUTPATIENT
Start: 2018-07-20 | End: 2018-11-15

## 2018-07-20 ASSESSMENT — ANXIETY QUESTIONNAIRES
GAD7 TOTAL SCORE: 21
1. FEELING NERVOUS, ANXIOUS, OR ON EDGE: NEARLY EVERY DAY
7. FEELING AFRAID AS IF SOMETHING AWFUL MIGHT HAPPEN: NEARLY EVERY DAY
3. WORRYING TOO MUCH ABOUT DIFFERENT THINGS: NEARLY EVERY DAY
6. BECOMING EASILY ANNOYED OR IRRITABLE: NEARLY EVERY DAY
2. NOT BEING ABLE TO STOP OR CONTROL WORRYING: NEARLY EVERY DAY
5. BEING SO RESTLESS THAT IT IS HARD TO SIT STILL: NEARLY EVERY DAY

## 2018-07-20 ASSESSMENT — PATIENT HEALTH QUESTIONNAIRE - PHQ9: 5. POOR APPETITE OR OVEREATING: NEARLY EVERY DAY
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
## 2018-07-20 NOTE — PATIENT INSTRUCTIONS
1. To schedule ultrasound of the back of the neck -   [facility] Scheduling Phone Number: [phone]  Or   [facility] Phone number: [phone]   2. Hip Flexor stretches - [employer]   3. Indomethacin 2 times daily for a week or so - see how this feels

## 2018-07-20 NOTE — MR AVS SNAPSHOT
After Visit Summary   7/20/2018    Geovani Bella    MRN: [id number]           Patient Information     Date Of Birth          1980        Visit Information        Provider Department      7/20/2018 9:40 AM Keo Phillips PA-C [facility]        Today's Diagnoses     Mass of neck    -  1    Pain of toe of left foot        Hip pain, bilateral        Screen for STD (sexually transmitted disease)          Care Instructions    1. To schedule ultrasound of the back of the neck -   [facility] Scheduling Phone Number: [phone]  Or   [facility] Scheduling Phone number: [phone]   2. Hip Flexor stretches - Youtube   3. Indomethacin 2 times daily for a week or so - see how this feels             Follow-ups after your visit        Future tests that were ordered for you today     Open Future Orders        Priority Expected Expires Ordered    US Head Neck Soft Tissue Routine  7/20/2019 7/20/2018            Who to contact     If you have questions or need follow up information about today's clinic visit or your schedule please contact [facility] directly at [phone].  Normal or non-critical lab and imaging results will be communicated to you by [employer], letter or phone within 4 business days after the clinic has received the results. If you do not hear from us within 7 days, please contact the clinic through [employer] or phone. If you have a critical or abnormal lab result, we will notify you by phone as soon as possible.  Submit refill requests through [employer] or call your pharmacy and they will forward the refill request to us. Please allow 3 business days for your refill to be completed.          Additional Information About Your Visit        [employer] Information     [employer] gives you secure access to your electronic health record. If you see a primary care provider, you can also send messages to your care team  "and make appointments. If you have questions, please call your primary care clinic.  If you do not have a primary care provider, please call [phone] and they will assist you.        Care EveryWhere ID     This is your Care EveryWhere ID. This could be used by other organizations to access your [place] medical records  [id number]        Your Vitals Were     Pulse Temperature Height BMI (Body Mass Index)          68 98.5  F (36.9  C) (Oral) 5' 8.5\" (1.74 m) 32.22 kg/m2         Blood Pressure from Last 3 Encounters:   07/20/18 128/78   02/05/18 133/82   12/29/17 120/76    Weight from Last 3 Encounters:   07/20/18 215 lb (97.5 kg)   02/05/18 214 lb (97.1 kg)   12/29/17 210 lb (95.3 kg)              We Performed the Following     ANTI-TREPONEMA EIA W/REFLEX     CHLAMYDIA TRACHOMATIS PCR     CRP, inflammation     ESR: Erythrocyte sedimentation rate     Hepatitis C antibody     HIV Antigen Antibody Combo     NEISSERIA GONORRHOEA PCR     Rheumatoid factor     Uric acid          Today's Medication Changes          These changes are accurate as of 7/20/18 10:17 AM.  If you have any questions, ask your nurse or doctor.               Start taking these medicines.        Dose/Directions    indomethacin 50 MG capsule   Commonly known as:  INDOCIN   Used for:  Hip pain, bilateral, Pain of toe of left foot   Started by:  Keo Phillips PA-C        Dose:  50 mg   Take 1 capsule (50 mg) by mouth 2 times daily (with meals)   Quantity:  42 capsule   Refills:  1            Where to get your medicines      These medications [facility] [address] [facility]  [address] [address] [address]    Hours:  24-hours Phone:  [phone]     indomethacin 50 MG capsule                Primary Care Provider Office Phone # Fax #    Keo Phillips PA-C [phone]       [facility] [address]      "   Equal Access to Services     [facility]: Hadii tanvi taylor jose [place], wajoannda luqadaha, qaybta kaalmabernice tolberttracegaurang, ramon contreras. So [facility] [phone].    ATENCIÓN: Si habla español, tiene a ragsdale disposición servicios gratuitos de asistencia lingüística. Rianaame al [phone].    We comply with applicable federal civil rights laws and [place] laws. We do not discriminate on the basis of race, color, national origin, age, disability, sex, sexual orientation, or gender identity.            Thank you!     Thank you for choosing [facility]  for your care. Our goal is always to provide you with excellent care. Hearing back from our patients is one way we can continue to improve our services. Please take a few minutes to complete the written survey that you may receive in the mail after your visit with us. Thank you!             Your Updated Medication List - Protect others around you: Learn how to safely use, store and throw away your medicines at www.disposemymeds.org.          This list is accurate as of 7/20/18 10:17 AM.  Always use your most recent med list.                   Brand Name Dispense Instructions for use Diagnosis    albuterol (2.5 MG/3ML) 0.083% neb solution     25 vial    Take 1 vial (2.5 mg) by nebulization every 6 hours as needed for shortness of breath / dyspnea or wheezing    Bronchitis with bronchospasm       busPIRone 15 MG tablet    BUSPAR    180 tablet    Take 1 tablet (15 mg) by mouth 2 times daily        CLONAZEPAM PO      Take  by mouth 2 times daily.        indomethacin 50 MG capsule    INDOCIN    42 capsule    Take 1 capsule (50 mg) by mouth 2 times daily (with meals)    Hip pain, bilateral, Pain of toe of left foot       methylphenidate ER 27 MG CR tablet    CONCERTA     27 mg        * NICOTINE STEP 3 7 MG/24HR 24 hr patch   Generic drug:  nicotine     28 patch    APPLY 1 PATCH EXTERNALLY TO THE SKIN EVERY 24 HOURS    Tobacco use  disorder       * NICOTINE STEP 2 14 MG/24HR 24 hr patch   Generic drug:  nicotine     28 patch    APPLY ONE PATCH ONTO THE SKIN EVERY 24 HOURS    Tobacco use disorder       * Notice:  This list has 2 medication(s) that are the same as other medications prescribed for you. Read the directions carefully, and ask your doctor or other care provider to review them with you.

## 2018-07-21 LAB — URATE SERPL-MCNC: 5.4 MG/DL (ref 3.5–7.2)

## 2018-07-21 ASSESSMENT — ANXIETY QUESTIONNAIRES: GAD7 TOTAL SCORE: 21

## 2018-07-21 ASSESSMENT — PATIENT HEALTH QUESTIONNAIRE - PHQ9: SUM OF ALL RESPONSES TO PHQ QUESTIONS 1-9: 19

## 2018-07-22 LAB
C TRACH DNA SPEC QL NAA+PROBE: NEGATIVE
N GONORRHOEA DNA SPEC QL NAA+PROBE: NEGATIVE
SPECIMEN SOURCE: NORMAL
SPECIMEN SOURCE: NORMAL

## 2018-07-23 LAB
HCV AB SERPL QL IA: NONREACTIVE
HIV 1+2 AB+HIV1 P24 AG SERPL QL IA: NONREACTIVE
RHEUMATOID FACT SER NEPH-ACNC: <20 IU/ML (ref 0–20)

## 2018-07-25 ENCOUNTER — RADIANT APPOINTMENT (OUTPATIENT)
Dept: ULTRASOUND IMAGING | Facility: CLINIC | Age: 38
End: 2018-07-25
Attending: PHYSICIAN ASSISTANT
Payer: COMMERCIAL

## 2018-07-25 DIAGNOSIS — R22.1 MASS OF NECK: ICD-10-CM

## 2018-07-25 PROCEDURE — 76536 US EXAM OF HEAD AND NECK: CPT

## 2018-07-27 ENCOUNTER — MYC MEDICAL ADVICE (OUTPATIENT)
Dept: FAMILY MEDICINE | Facility: CLINIC | Age: 38
End: 2018-07-27

## 2018-08-03 NOTE — TELEPHONE ENCOUNTER
"Geovani Bella is a 37 year old male who calls with pain in arm and neck.    NURSING ASSESSMENT:  Description:  See below. Called patient, he believes this pain is related to the mass in his neck. He denies numbness. He does report shanell weakness in the arm but says \"I think that it's related to my anxiety.\" It's very painful to turn his head to the left, or raise his arm up. Denies chest pain, shortness of breath, nausea/vomiting, cool moist skin. He only has the pain when he moves his neck or arm a certain way, was not having the pain during the phone conversation.   Onset/duration:  Started this morning  Precip. factors:  Patient believes it's related to his mass  Associated symptoms:  See description  Improves/worsens symptoms:  Not moving his head to the left, or raising his arm  Pain scale (0-10)  Would not rate a number, \"severe at times\"  Allergies: No Known Allergies    NURSING PLAN: Huddle with provider, plan includes go to urgent care now, go to ER if chest pain or shortness of breath occurs.    RECOMMENDED DISPOSITION:  To ED/UC for evaluation, another person to drive   Will comply with recommendation: Yes  If further questions/concerns or if symptoms do not improve, worsen or new symptoms develop, call your PCP or [facility] as soon as possible.      Guideline used:  Telephone Triage Protocols for Nurses, Fifth Edition, Sonal Briones \"arm problems\" and \"neck pain\"    Steve Carbajal RN      "

## 2018-08-03 NOTE — TELEPHONE ENCOUNTER
Reason for Call:  Other     Detailed comments: Patient woke up today with incredible pain through the neck and then into the shoulders/ cannot lift you head and arm at the same time/ skin is tight from the lump or mass/ patient is not sure what he should do or where he should go to get help?? Please call.    Phone Number Patient can be reached at: Home number on file [phone] (home) or Work number on file:  [phone]    Best Time: any    Can we leave a detailed message on this number? YES    Call taken on 8/3/2018 at 10:39 AM by Faviola Isaac

## 2018-11-06 ENCOUNTER — OFFICE VISIT (OUTPATIENT)
Dept: FAMILY MEDICINE | Facility: CLINIC | Age: 38
End: 2018-11-06
Payer: COMMERCIAL

## 2018-11-06 VITALS
OXYGEN SATURATION: 98 % | BODY MASS INDEX: 32.36 KG/M2 | DIASTOLIC BLOOD PRESSURE: 77 MMHG | HEART RATE: 71 BPM | WEIGHT: 216 LBS | TEMPERATURE: 99.2 F | SYSTOLIC BLOOD PRESSURE: 115 MMHG

## 2018-11-06 DIAGNOSIS — F32.4 MAJOR DEPRESSIVE DISORDER IN PARTIAL REMISSION, UNSPECIFIED WHETHER RECURRENT (H): ICD-10-CM

## 2018-11-06 DIAGNOSIS — L02.11 ABSCESS OF NECK: ICD-10-CM

## 2018-11-06 DIAGNOSIS — R07.0 THROAT PAIN: Primary | ICD-10-CM

## 2018-11-06 LAB
DEPRECATED S PYO AG THROAT QL EIA: NORMAL
SPECIMEN SOURCE: NORMAL

## 2018-11-06 PROCEDURE — 99213 OFFICE O/P EST LOW 20 MIN: CPT | Performed by: FAMILY MEDICINE

## 2018-11-06 PROCEDURE — 87081 CULTURE SCREEN ONLY: CPT | Performed by: FAMILY MEDICINE

## 2018-11-06 PROCEDURE — 87880 STREP A ASSAY W/OPTIC: CPT | Performed by: FAMILY MEDICINE

## 2018-11-06 RX ORDER — CEPHALEXIN 500 MG/1
500 CAPSULE ORAL 3 TIMES DAILY
Qty: 30 CAPSULE | Refills: 0 | Status: SHIPPED | OUTPATIENT
Start: 2018-11-06 | End: 2018-11-15

## 2018-11-06 RX ORDER — SERTRALINE HYDROCHLORIDE 100 MG/1
200 TABLET, FILM COATED ORAL DAILY
Qty: 60 TABLET | Refills: 1 | COMMUNITY
Start: 2018-11-06

## 2018-11-06 NOTE — MR AVS SNAPSHOT
After Visit Summary   11/6/2018    Geovani Bella    MRN: [id number]           Patient Information     Date Of Birth          1980        Visit Information        Provider Department      11/6/2018 2:40 PM Campbell Lopez MD [facility]        Today's Diagnoses     Throat pain    -  1    Major depressive disorder in partial remission, unspecified whether recurrent (H)        Abscess of neck           Follow-ups after your visit        Who to contact     If you have questions or need follow up information about today's clinic visit or your schedule please contact [facility] directly at [phone].  Normal or non-critical lab and imaging results will be communicated to you by MyChart, letter or phone within 4 business days after the clinic has received the results. If you do not hear from us within 7 days, please contact the clinic through Mobile[employer]shart or phone. If you have a critical or abnormal lab result, we will notify you by phone as soon as possible.  Submit refill requests through [employer] or call your pharmacy and they will forward the refill request to us. Please allow 3 business days for your refill to be completed.          Additional Information About Your Visit        MyChart Information     [employer] gives you secure access to your electronic health record. If you see a primary care provider, you can also send messages to your care team and make appointments. If you have questions, please call your primary care clinic.  If you do not have a primary care provider, please call [phone] and they will assist you.        Care EveryWhere ID     This is your Care EveryWhere ID. This could be used by other organizations to access your [place] medical records  [id number]        Your Vitals Were     Pulse Temperature Pulse Oximetry BMI (Body Mass Index)          71 99.2  F (37.3  C) (Oral) 98% 32.36 kg/m2         Blood Pressure from Last  3 Encounters:   11/06/18 115/77   07/20/18 128/78   02/05/18 133/82    Weight from Last 3 Encounters:   11/06/18 216 lb (98 kg)   07/20/18 215 lb (97.5 kg)   02/05/18 214 lb (97.1 kg)              We Performed the Following     Strep, Rapid Screen          Today's Medication Changes          These changes are accurate as of 11/6/18  3:10 PM.  If you have any questions, ask your nurse or doctor.               Start taking these medicines.        Dose/Directions    cephALEXin 500 MG capsule   Commonly known as:  KEFLEX   Used for:  Throat pain, Abscess of neck   Started by:  Campbell Lopez MD        Dose:  500 mg   Take 1 capsule (500 mg) by mouth 3 times daily   Quantity:  30 capsule   Refills:  0            Where to get your medicines      [id number] [address] [facility]  [address] [address] [facility] [place] [facility] [address]    Hours:  24-hours Phone:  [phone]     cephALEXin 500 MG capsule                Primary Care Provider Office Phone # Fax #    Keo Phillips PA-C [phone]       [facility] [address]        Equal Access to Services     YARELIS DAWKINS AH: Hadii tanvi ku hadasho Soomaali, waaxda luqadaha, qaybta kaalmada adeegyada, waxay idiin hayaan daryl contreras. So [facility] [phone].    ATENCIÓN: Si habla español, tiene a ragsdale disposición servicios gratuitos de asistencia lingüística. [facility] [phone].    We comply with applicable federal civil rights laws and [place] laws. We do not discriminate on the basis of race, color, national origin, age, disability, sex, sexual orientation, or gender identity.            Thank you!     Thank you for choosing [facility]  for your care. Our goal is always to provide you with excellent care. Hearing back from our patients is one way we can continue to improve our services. Please take a few  minutes to complete the written survey that you may receive in the mail after your visit with us. Thank you!             Your Updated Medication List - Protect others around you: Learn how to safely use, store and throw away your medicines at www.disposemymeds.org.          This list is accurate as of 11/6/18  3:10 PM.  Always use your most recent med list.                   Brand Name Dispense Instructions for use Diagnosis    albuterol (2.5 MG/3ML) 0.083% neb solution     25 vial    Take 1 vial (2.5 mg) by nebulization every 6 hours as needed for shortness of breath / dyspnea or wheezing    Bronchitis with bronchospasm       busPIRone 15 MG tablet    BUSPAR    180 tablet    Take 1 tablet (15 mg) by mouth 2 times daily        cephALEXin 500 MG capsule    KEFLEX    30 capsule    Take 1 capsule (500 mg) by mouth 3 times daily    Throat pain, Abscess of neck       CLONAZEPAM PO      Take  by mouth 2 times daily.        indomethacin 50 MG capsule    INDOCIN    42 capsule    Take 1 capsule (50 mg) by mouth 2 times daily (with meals)    Hip pain, bilateral, Pain of toe of left foot       methylphenidate ER 27 MG CR tablet    CONCERTA     27 mg        * NICOTINE STEP 3 7 MG/24HR 24 hr patch   Generic drug:  nicotine     28 patch    APPLY 1 PATCH EXTERNALLY TO THE SKIN EVERY 24 HOURS    Tobacco use disorder       * NICOTINE STEP 2 14 MG/24HR 24 hr patch   Generic drug:  nicotine     28 patch    APPLY ONE PATCH ONTO THE SKIN EVERY 24 HOURS    Tobacco use disorder       sertraline 100 MG tablet    ZOLOFT    60 tablet    Take 2 tablets (200 mg) by mouth daily    Major depressive disorder in partial remission, unspecified whether recurrent (H)       * Notice:  This list has 2 medication(s) that are the same as other medications prescribed for you. Read the directions carefully, and ask your doctor or other care provider to review them with you.

## 2018-11-06 NOTE — PROGRESS NOTES
SUBJECTIVE:   Geovani Bella is a 38 year old male who presents to clinic today for the following health issues:      Check lesion back right side of neck      Acute Illness   Acute illness concerns: Sore Throat  Onset:  X 3 days    Fever: no    Chills/Sweats: no    Headache (location?): YES    Sinus Pressure:no    Conjunctivitis:  no    Ear Pain: YES: bilateral    Rhinorrhea: no    Congestion: no    Sore Throat: YES     Cough: YES-productive of little of red sputum    Wheeze: YES    Decreased Appetite: no     Nausea: YES- Little    Vomiting: no    Diarrhea:  no    Dysuria/Freq.: no    Fatigue/Achiness: YES    Sick/Strep Exposure: no     Therapies Tried and outcome: Ibu - Helps for a short amount of time    Father  from  [place]-rectal cancer and lymphoma   Uncle was retaining water and his kidney were not functioning     Problem list and histories reviewed & adjusted, as indicated.        Reviewed and updated as needed this visit by clinical staff       Reviewed and updated as needed this visit by Provider       Patient being treated for depression by a psychiatrist       O: /77 (BP Location: Right arm, Patient Position: Sitting, Cuff Size: Adult Large)  Pulse 71  Temp 99.2  F (37.3  C) (Oral)  Wt 216 lb (98 kg)  SpO2 98%  BMI 32.36 kg/m2    Wt Readings from Last 4 Encounters:   18 216 lb (98 kg)   18 215 lb (97.5 kg)   18 214 lb (97.1 kg)   17 210 lb (95.3 kg)     Right side of the neck has an abscess on the back that looks a little raw but is not terribly tender.  It is approximately 2 cm in diameter     Head: Normocephalic, atraumatic.  Eyes: Conjunctiva clear, non icteric. PERRLA.  Ears: External ears and TMs normal BL.  Nose: Septum midline, nasal mucosa pink and moist. No discharge.  Mouth / Throat: Normal dentition.  No oral lesions. Pharynx non erythematous, tonsils without hypertrophy.  Neck: Supple, no enlarged LN, trachea midline.      Results for orders placed  or performed in visit on 11/06/18   Strep, Rapid Screen   Result Value Ref Range    Specimen Description Throat     Rapid Strep A Screen       NEGATIVE: No Group A streptococcal antigen detected by immunoassay, await culture report.         ICD-10-CM    1. Throat pain R07.0 Strep, Rapid Screen     cephALEXin (KEFLEX) 500 MG capsule   2. Major depressive disorder in partial remission, unspecified whether recurrent (H) F32.4 sertraline (ZOLOFT) 100 MG tablet   3. Abscess of neck L02.11 cephALEXin (KEFLEX) 500 MG capsule

## 2018-11-07 LAB
BACTERIA SPEC CULT: NORMAL
SPECIMEN SOURCE: NORMAL

## 2018-11-15 ENCOUNTER — OFFICE VISIT (OUTPATIENT)
Dept: FAMILY MEDICINE | Facility: CLINIC | Age: 38
End: 2018-11-15
Payer: COMMERCIAL

## 2018-11-15 VITALS
OXYGEN SATURATION: 98 % | WEIGHT: 216 LBS | TEMPERATURE: 97.5 F | HEART RATE: 65 BPM | DIASTOLIC BLOOD PRESSURE: 77 MMHG | BODY MASS INDEX: 32.36 KG/M2 | SYSTOLIC BLOOD PRESSURE: 126 MMHG

## 2018-11-15 DIAGNOSIS — M54.16 LUMBAR RADICULOPATHY: ICD-10-CM

## 2018-11-15 DIAGNOSIS — G89.29 ACUTE EXACERBATION OF CHRONIC LOW BACK PAIN: Primary | ICD-10-CM

## 2018-11-15 DIAGNOSIS — G89.29 CHRONIC LOW BACK PAIN WITH SCIATICA, SCIATICA LATERALITY UNSPECIFIED, UNSPECIFIED BACK PAIN LATERALITY: ICD-10-CM

## 2018-11-15 DIAGNOSIS — M54.40 CHRONIC LOW BACK PAIN WITH SCIATICA, SCIATICA LATERALITY UNSPECIFIED, UNSPECIFIED BACK PAIN LATERALITY: ICD-10-CM

## 2018-11-15 DIAGNOSIS — M54.50 ACUTE EXACERBATION OF CHRONIC LOW BACK PAIN: Primary | ICD-10-CM

## 2018-11-15 PROCEDURE — 96372 THER/PROPH/DIAG INJ SC/IM: CPT | Performed by: FAMILY MEDICINE

## 2018-11-15 PROCEDURE — 99214 OFFICE O/P EST MOD 30 MIN: CPT | Mod: 25 | Performed by: FAMILY MEDICINE

## 2018-11-15 RX ORDER — IBUPROFEN 600 MG/1
TABLET, FILM COATED ORAL
Refills: 0 | COMMUNITY
Start: 2018-08-03 | End: 2018-11-15

## 2018-11-15 RX ORDER — PREDNISONE 20 MG/1
TABLET ORAL
Qty: 10 TABLET | Refills: 0 | Status: SHIPPED | OUTPATIENT
Start: 2018-11-15 | End: 2019-06-27

## 2018-11-15 RX ORDER — CYCLOBENZAPRINE HCL 10 MG
TABLET ORAL
Qty: 30 TABLET | Refills: 1 | Status: SHIPPED | OUTPATIENT
Start: 2018-11-15 | End: 2019-06-27

## 2018-11-15 RX ORDER — METHYLPHENIDATE HYDROCHLORIDE 27 MG/1
TABLET, EXTENDED RELEASE ORAL
COMMUNITY
Start: 2016-01-02

## 2018-11-15 RX ORDER — KETOROLAC TROMETHAMINE 30 MG/ML
60 INJECTION, SOLUTION INTRAMUSCULAR; INTRAVENOUS ONCE
Qty: 2 ML | Refills: 0 | OUTPATIENT
Start: 2018-11-15 | End: 2018-11-15

## 2018-11-15 RX ORDER — TRAMADOL HYDROCHLORIDE 50 MG/1
TABLET ORAL
Qty: 30 TABLET | Refills: 0 | Status: SHIPPED | OUTPATIENT
Start: 2018-11-15 | End: 2018-11-19

## 2018-11-15 RX ORDER — ALBUTEROL SULFATE 90 UG/1
AEROSOL, METERED RESPIRATORY (INHALATION)
Refills: 0 | COMMUNITY
Start: 2017-12-29 | End: 2019-06-27

## 2018-11-15 ASSESSMENT — PAIN SCALES - GENERAL: PAINLEVEL: EXTREME PAIN (8)

## 2018-11-15 NOTE — LETTER
My Depression Action Plan  Name: Geovani Bella   Date of Birth 1980  Date: 11/15/2018    My doctor: Keo Phillips   My clinic: [address] [id number] [phone]          GREEN    ZONE   Good Control    What it looks like:     Things are going generally well. You have normal up s and down s. You may even feel depressed from time to time, but bad moods usually last less than a day.   What you need to do:  1. Continue to care for yourself (see self care plan)  2. Check your depression survival kit and update it as needed  3. Follow your physician s recommendations including any medication.  4. Do not stop taking medication unless you consult with your physician first.           YELLOW         ZONE Getting Worse    What it looks like:     Depression is starting to interfere with your life.     It may be hard to get out of bed; you may be starting to isolate yourself from others.    Symptoms of depression are starting to last most all day and this has happened for several days.     You may have suicidal thoughts but they are not constant.   What you need to do:     1. Call your care team, your response to treatment will improve if you keep your care team informed of your progress. Yellow periods are signs an adjustment may need to be made.     2. Continue your self-care, even if you have to fake it!    3. Talk to someone in your support network    4. Open up your depression survival kit           RED    ZONE Medical Alert - Get Help    What it looks like:     Depression is seriously interfering with your life.     You may experience these or other symptoms: You can t get out of bed most days, can t work or engage in other necessary activities, you have trouble taking care of basic hygiene, or basic responsibilities, thoughts of suicide or death that will not go away, self-injurious behavior.     What you need to  do:  1. Call your care team and request a same-day appointment. If they are not available (weekends or after hours) call your local crisis line, emergency room or 911.            Depression Self Care Plan / Survival Kit    Self-Care for Depression  Here s the deal. Your body and mind are really not as separate as most people think.  What you do and think affects how you feel and how you feel influences what you do and think. This means if you do things that people who feel good do, it will help you feel better.  Sometimes this is all it takes.  There is also a place for medication and therapy depending on how severe your depression is, so be sure to consult with your medical provider and/ or Behavioral Health Consultant if your symptoms are worsening or not improving.     In order to better manage my stress, I will:    Exercise  Get some form of exercise, every day. This will help reduce pain and release endorphins, the  feel good  chemicals in your brain. This is almost as good as taking antidepressants!  This is not the same as joining a gym and then never going! (they count on that by the way ) It can be as simple as just going for a walk or doing some gardening, anything that will get you moving.      Hygiene   Maintain good hygiene (Get out of bed in the morning, Make your bed, Brush your teeth, Take a shower, and Get dressed like you were going to work, even if you are unemployed).  If your clothes don't fit try to get ones that do.    Diet  I will strive to eat foods that are good for me, drink plenty of water, and avoid excessive sugar, caffeine, alcohol, and other mood-altering substances.  Some foods that are helpful in depression are: complex carbohydrates, B vitamins, flaxseed, fish or fish oil, fresh fruits and vegetables.    Psychotherapy  I agree to participate in Individual Therapy (if recommended).    Medication  If prescribed medications, I agree to take them.  Missing doses can result in serious  side effects.  I understand that drinking alcohol, or other illicit drug use, may cause potential side effects.  I will not stop my medication abruptly without first discussing it with my provider.    Staying Connected With Others  I will stay in touch with my friends, family members, and my primary care provider/team.    Use your imagination  Be creative.  We all have a creative side; it doesn t matter if it s oil painting, sand castles, or mud pies! This will also kick up the endorphins.    Witness Beauty  (AKA stop and smell the roses) Take a look outside, even in mid-winter. Notice colors, textures. Watch the squirrels and birds.     Service to others  Be of service to others.  There is always someone else in need.  By helping others we can  get out of ourselves  and remember the really important things.  This also provides opportunities for practicing all the other parts of the program.    Humor  Laugh and be silly!  Adjust your TV habits for less news and crime-drama and more comedy.    Control your stress  Try breathing deep, massage therapy, biofeedback, and meditation. Find time to relax each day.     My support system    Clinic Contact:  Phone number:    Contact 1:  Phone number:    Contact 2:  Phone number:    [employer]/:  Phone number:    Therapist:  Phone number:    Local crisis center:    Phone number:    Other community support:  Phone number:

## 2018-11-15 NOTE — PATIENT INSTRUCTIONS
Back Exercises: Abdominal Lift Brace with Marching    The abdominal lift brace with march strengthens your lower abdominal muscles, helping you keep your pelvis and back stable:    Lie on the floor with both knees bent. Put your feet flat on the floor and your arms by your sides. Tighten your abdominal muscles. Be sure to continue to breathe.    Lift one bent knee about 2 inches then return it to the floor and lift the other about 2 inches. Keep your abdominal muscles tight and continue to breathe. These motions should be slow and controlled without your pelvis rocking side to side.    Repeat 10 times.  Date Last Reviewed: 3/1/2018    0413-4526 [employer]. [address] [place] [address], [place] [address]. All rights reserved. This information is not intended as a substitute for professional medical care. Always follow your healthcare professional's instructions.
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
## 2018-11-15 NOTE — MR AVS SNAPSHOT
After Visit Summary   11/15/2018    Geovani Bella    MRN: [id number]           Patient Information     Date Of Birth          1980        Visit Information        Provider Department      11/15/2018 1:20 PM Jean-Pierre Gregory MD [facility]        Today's Diagnoses     Need for prophylactic vaccination and inoculation against influenza    -  1    Lumbar radiculopathy          Care Instructions      Back Exercises: Abdominal Lift Brace with Marching    The abdominal lift brace with march strengthens your lower abdominal muscles, helping you keep your pelvis and back stable:    Lie on the floor with both knees bent. Put your feet flat on the floor and your arms by your sides. Tighten your abdominal muscles. Be sure to continue to breathe.    Lift one bent knee about 2 inches then return it to the floor and lift the other about 2 inches. Keep your abdominal muscles tight and continue to breathe. These motions should be slow and controlled without your pelvis rocking side to side.    Repeat 10 times.  Date Last Reviewed: 3/1/2018    8672-1003 [employer]. [address] [place] [address]. All rights reserved. This information is not intended as a substitute for professional medical care. Always follow your healthcare professional's instructions.                Follow-ups after your visit        Your next 10 appointments already scheduled     Nov 19, 2018  9:00 AM CST   SHORT with Keo Phillips PA-C   [facility]    [address] [place] [id number] [phone]              Who to contact     If you have questions or need follow up information about today's clinic visit or your schedule please contact [facility] directly at [phone].  Normal or non-critical lab and imaging results will be communicated to you by MyChart, letter or phone within 4 business  days after the clinic has received the results. If you do not hear from us within 7 days, please contact the clinic through [employer] or phone. If you have a critical or abnormal lab result, we will notify you by phone as soon as possible.  Submit refill requests through [employer] or call your pharmacy and they will forward the refill request to us. Please allow 3 business days for your refill to be completed.          Additional Information About Your Visit        [employer] Information     [employer] gives you secure access to your electronic health record. If you see a primary care provider, you can also send messages to your care team and make appointments. If you have questions, please call your primary care clinic.  If you do not have a primary care provider, please call [phone] and they will assist you.        Care EveryWhere ID     This is your Care EveryWhere ID. This could be used by other organizations to access your [place] medical records  [id number]        Your Vitals Were     Pulse Temperature Pulse Oximetry BMI (Body Mass Index)          65 97.5  F (36.4  C) (Oral) 98% 32.36 kg/m2         Blood Pressure from Last 3 Encounters:   11/15/18 126/77   11/06/18 115/77   07/20/18 128/78    Weight from Last 3 Encounters:   11/15/18 216 lb (98 kg)   11/06/18 216 lb (98 kg)   07/20/18 215 lb (97.5 kg)              We Performed the Following     DEPRESSION ACTION PLAN (DAP)          Today's Medication Changes          These changes are accurate as of 11/15/18  1:50 PM.  If you have any questions, ask your nurse or doctor.               Start taking these medicines.        Dose/Directions    cyclobenzaprine 10 MG tablet   Commonly known as:  FLEXERIL   Used for:  Lumbar radiculopathy   Started by:  Jean-Pierre Gregory MD        One tab at bedtime as needed   Quantity:  30 tablet   Refills:  1       ketorolac 60 MG/2ML Soln injection   Commonly known as:  TORADOL   Used for:  Lumbar radiculopathy   Started by:  Bri  MD Jean-Pierre        Dose:  60 mg   Inject 2 mLs (60 mg) into the muscle once for 1 dose   Quantity:  2 mL   Refills:  0       nabumetone 750 MG tablet   Commonly known as:  RELAFEN   Used for:  Lumbar radiculopathy   Started by:  Jean-Pierre Gregory MD        Dose:  750 mg   Take 1 tablet (750 mg) by mouth 2 times daily as needed for moderate pain   Quantity:  60 tablet   Refills:  1       predniSONE 20 MG tablet   Commonly known as:  DELTASONE   Used for:  Lumbar radiculopathy   Started by:  Jean-Pierre Gregory MD        2 tab daily for 5 days   Quantity:  10 tablet   Refills:  0       traMADol 50 MG tablet   Commonly known as:  ULTRAM   Used for:  Need for prophylactic vaccination and inoculation against influenza, Lumbar radiculopathy   Started by:  Jean-Pierre Gregory MD        One tab at bedtime as needed for pain   Quantity:  30 tablet   Refills:  0            Where to get your medicines      These medications were sent to [facility] [address] [place] [address] [facility] [address] [facility]  [address] [facility] [address]    Hours:  24-hours Phone:  [phone]     cyclobenzaprine 10 MG tablet    nabumetone 750 MG tablet    predniSONE 20 MG tablet         Some of these will need a paper prescription and others can be bought over the counter.  Ask your nurse if you have questions.     Bring a paper prescription for each of these medications     traMADol 50 MG tablet       You don't need a prescription for these medications     ketorolac 60 MG/2ML Soln injection               Information about OPIOIDS     PRESCRIPTION OPIOIDS: WHAT YOU NEED TO KNOW   We gave you an opioid (narcotic) pain medicine. It is important to manage your pain, but opioids are not always the best choice. You should first try all the other options your care team gave you. Take this medicine for as short a time (and as few doses) as possible.    Some activities can increase your pain, such as bandage  changes or therapy sessions. It may help to take your pain medicine 30 to 60 minutes before these activities. Reduce your stress by getting enough sleep, working on hobbies you enjoy and practicing relaxation or meditation. Talk to your care team about ways to manage your pain beyond prescription opioids.    These medicines have risks:    DO NOT drive when on new or higher doses of pain medicine. These medicines can affect your alertness and reaction times, and you could be arrested for driving under the influence (DUI). If you need to use opioids long-term, talk to your care team about driving.    DO NOT operate heavy machinery    DO NOT do any other dangerous activities while taking these medicines.    DO NOT drink any alcohol while taking these medicines.     If the opioid prescribed includes acetaminophen, DO NOT take with any other medicines that contain acetaminophen. Read all labels carefully. Look for the word  acetaminophen  or  Tylenol.  Ask your pharmacist if you have questions or are unsure.    You can get addicted to pain medicines, especially if you have a history of addiction (chemical, alcohol or substance dependence). Talk to your care team about ways to reduce this risk.    All opioids tend to cause constipation. Drink plenty of water and eat foods that have a lot of fiber, such as fruits, vegetables, prune juice, apple juice and high-fiber cereal. Take a laxative (Miralax, milk of magnesia, Colace, Senna) if you don t move your bowels at least every other day. Other side effects include upset stomach, sleepiness, dizziness, throwing up, tolerance (needing more of the medicine to have the same effect), physical dependence and slowed breathing.    Store your pills in a secure place, locked if possible. We will not replace any lost or stolen medicine. If you don t finish your medicine, please throw away (dispose) as directed by your pharmacist. The [place] Pollution [employer] has more  information about safe disposal: [email or web address]-medications         Primary Care Provider Office Phone # Fax #    Keo Phillips PA-C [phone]       [address] [facility] [address]        Equal Access to Services     YARELIS DAWKINS : Hadii aad ku hadasho Soomaali, waaxda luqadaha, qaybta kaalmada adeegyada, waxay idiin hayaan adetrace khleninsh larosan . So [facility] [phone].    ATENCIÓN: Si habla español, tiene a ragsdale disposición servicios gratuitos de asistencia lingüística. Bryson al [phone].    We comply with applicable federal civil rights laws and [place] laws. We do not discriminate on the basis of race, color, national origin, age, disability, sex, sexual orientation, or gender identity.            Thank you!     Thank you for choosing [facility]  for your care. Our goal is always to provide you with excellent care. Hearing back from our patients is one way we can continue to improve our services. Please take a few minutes to complete the written survey that you may receive in the mail after your visit with us. Thank you!             Your Updated Medication List - Protect others around you: Learn how to safely use, store and throw away your medicines at www.disposemymeds.org.          This list is accurate as of 11/15/18  1:50 PM.  Always use your most recent med list.                   Brand Name Dispense Instructions for use Diagnosis    * albuterol (2.5 MG/3ML) 0.083% neb solution     25 vial    Take 1 vial (2.5 mg) by nebulization every 6 hours as needed for shortness of breath / dyspnea or wheezing    Bronchitis with bronchospasm       * VENTOLIN  (90 Base) MCG/ACT inhaler   Generic drug:  albuterol      INL 2 PFS ITL Q 6 H PRF SOB OR DIFFICULT BREATHING        busPIRone 15 MG tablet    BUSPAR    180 tablet    Take 1 tablet (15 mg) by mouth 2 times daily        CLONAZEPAM PO      Take  by mouth 2  times daily.        cyclobenzaprine 10 MG tablet    FLEXERIL    30 tablet    One tab at bedtime as needed    Lumbar radiculopathy       ketorolac 60 MG/2ML Soln injection    TORADOL    2 mL    Inject 2 mLs (60 mg) into the muscle once for 1 dose    Lumbar radiculopathy       * methylphenidate ER 27 MG CR tablet    CONCERTA     27 mg        * methylphenidate ER 27 MG 24H tablet           nabumetone 750 MG tablet    RELAFEN    60 tablet    Take 1 tablet (750 mg) by mouth 2 times daily as needed for moderate pain    Lumbar radiculopathy       predniSONE 20 MG tablet    DELTASONE    10 tablet    2 tab daily for 5 days    Lumbar radiculopathy       sertraline 100 MG tablet    ZOLOFT    60 tablet    Take 2 tablets (200 mg) by mouth daily    Major depressive disorder in partial remission, unspecified whether recurrent (H)       traMADol 50 MG tablet    ULTRAM    30 tablet    One tab at bedtime as needed for pain    Need for prophylactic vaccination and inoculation against influenza, Lumbar radiculopathy       * Notice:  This list has 4 medication(s) that are the same as other medications prescribed for you. Read the directions carefully, and ask your doctor or other care provider to review them with you.

## 2018-11-19 ENCOUNTER — OFFICE VISIT (OUTPATIENT)
Dept: FAMILY MEDICINE | Facility: CLINIC | Age: 38
End: 2018-11-19
Payer: COMMERCIAL

## 2018-11-19 VITALS
SYSTOLIC BLOOD PRESSURE: 120 MMHG | BODY MASS INDEX: 31.99 KG/M2 | HEIGHT: 69 IN | TEMPERATURE: 98.6 F | WEIGHT: 216 LBS | DIASTOLIC BLOOD PRESSURE: 78 MMHG | HEART RATE: 64 BPM

## 2018-11-19 DIAGNOSIS — R20.2 ARM PARESTHESIA, RIGHT: ICD-10-CM

## 2018-11-19 DIAGNOSIS — M54.42 ACUTE LEFT-SIDED LOW BACK PAIN WITH LEFT-SIDED SCIATICA: ICD-10-CM

## 2018-11-19 DIAGNOSIS — G89.29 CHRONIC LEFT-SIDED LOW BACK PAIN WITH LEFT-SIDED SCIATICA: Primary | ICD-10-CM

## 2018-11-19 DIAGNOSIS — G89.29 CHRONIC LOW BACK PAIN WITH SCIATICA, SCIATICA LATERALITY UNSPECIFIED, UNSPECIFIED BACK PAIN LATERALITY: ICD-10-CM

## 2018-11-19 DIAGNOSIS — M54.40 CHRONIC LOW BACK PAIN WITH SCIATICA, SCIATICA LATERALITY UNSPECIFIED, UNSPECIFIED BACK PAIN LATERALITY: ICD-10-CM

## 2018-11-19 DIAGNOSIS — M54.16 LUMBAR RADICULOPATHY: ICD-10-CM

## 2018-11-19 DIAGNOSIS — M54.42 CHRONIC LEFT-SIDED LOW BACK PAIN WITH LEFT-SIDED SCIATICA: Primary | ICD-10-CM

## 2018-11-19 PROCEDURE — 99213 OFFICE O/P EST LOW 20 MIN: CPT | Performed by: PHYSICIAN ASSISTANT

## 2018-11-19 RX ORDER — TRAMADOL HYDROCHLORIDE 50 MG/1
TABLET ORAL
Qty: 30 TABLET | Refills: 0 | Status: SHIPPED | OUTPATIENT
Start: 2018-11-19 | End: 2019-06-27
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
## 2018-11-19 NOTE — MR AVS SNAPSHOT
After Visit Summary   11/19/2018    Geovani Bella    MRN: [id number]           Patient Information     Date Of Birth          1980        Visit Information        Provider Department      11/19/2018 9:00 AM Keo Phillips PA-C [facility]        Today's Diagnoses     Chronic left-sided low back pain with left-sided sciatica    -  1    Acute left-sided low back pain with left-sided sciatica        Arm paresthesia, right        Lumbar radiculopathy        Chronic low back pain with sciatica, sciatica laterality unspecified, unspecified back pain laterality           Follow-ups after your visit        Additional Services     PING PT, HAND, AND CHIROPRACTIC REFERRAL       Physical Therapy, Hand Therapy and Chiropractic Care are available through:  *[facility]  *Hand Therapy (Occupational Therapy or Physical Therapy)  *[facility]    Call one number to schedule at any of the above locations: [phone].    Physical therapy, Hand therapy and/or Chiropractic care has been recommended by your physician as an excellent treatment option to reduce pain and help people return to normal activities, including sports.  Therapy and/or chiropractic care services are a great complement or alternative to expensive and invasive surgery, injections, or long-term use of prescription medications. The primary goal is to identify the underlying problem and provide you the tools to manage your condition on your own.     Please be aware that coverage of these services is subject to the terms and limitations of your health insurance plan.  Call member services at your health plan with any benefit or coverage questions.      Please bring the following to your appointment:  *Your personal calendar for scheduling future appointments  *Comfortable clothing            SPINE SURGERY REFERRAL       You have been referred to: Spine Lumbar: Spine Surgeon:  Patient consider [facility] per coverage / will check his plan    Please be aware that coverage of these services is subject to the terms and limitations of your health insurance plan.  Call member services at your health plan with any benefit or coverage questions.      Please bring the following to your appointment:    **Any x-rays, CTs or MRIs which have been performed.  Contact the facility where they were done to arrange for  prior to your scheduled appointment.    **List of current medications   **This referral request   **Any documents/labs given to you regarding this referral                  Future tests that were ordered for you today     Open Future Orders        Priority Expected Expires Ordered    PING PT, HAND, AND CHIROPRACTIC REFERRAL Routine  11/19/2019 11/19/2018            Who to contact     If you have questions or need follow up information about today's clinic visit or your schedule please contact [facility] directly at [phone].  Normal or non-critical lab and imaging results will be communicated to you by MyChart, letter or phone within 4 business days after the clinic has received the results. If you do not hear from us within 7 days, please contact the clinic through [facility] or phone. If you have a critical or abnormal lab result, we will notify you by phone as soon as possible.  Submit refill requests through [employer] or call your pharmacy and they will forward the refill request to us. Please allow 3 business days for your refill to be completed.          Additional Information About Your Visit        [facility] Information     [employer] gives you secure access to your electronic health record. If you see a primary care provider, you can also send messages to your care team and make appointments. If you have questions, please call your primary care clinic.  If you do not have a primary care provider, please call [phone] and they will assist you.       "  Care EveryWhere ID     This is your Care EveryWhere ID. This could be used by other organizations to access your [facility] medical records  [id number]        Your Vitals Were     Pulse Temperature Height BMI (Body Mass Index)          64 98.6  F (37  C) (Oral) 5' 8.5\" (1.74 m) 32.36 kg/m2         Blood Pressure from Last 3 Encounters:   11/19/18 120/78   11/15/18 126/77   11/06/18 115/77    Weight from Last 3 Encounters:   11/19/18 216 lb (98 kg)   11/15/18 216 lb (98 kg)   11/06/18 216 lb (98 kg)              We Performed the Following     SPINE SURGERY REFERRAL          Today's Medication Changes          These changes are accurate as of 11/19/18  9:39 AM.  If you have any questions, ask your nurse or doctor.               These medicines have changed or have updated prescriptions.        Dose/Directions    traMADol 50 MG tablet   Commonly known as:  ULTRAM   This may have changed:  additional instructions   Used for:  Lumbar radiculopathy, Chronic low back pain with sciatica, sciatica laterality unspecified, unspecified back pain laterality   Changed by:  Keo Phillips PA-C        1 to 2 tablets every 8 hours for pain   Quantity:  30 tablet   Refills:  0            Where to get your medicines      Some of these will need a paper prescription and others can be bought over the counter.  Ask your nurse if you have questions.     Bring a paper prescription for each of these medications     traMADol 50 MG tablet               Information about OPIOIDS     PRESCRIPTION OPIOIDS: WHAT YOU NEED TO KNOW   We gave you an opioid (narcotic) pain medicine. It is important to manage your pain, but opioids are not always the best choice. You should first try all the other options your care team gave you. Take this medicine for as short a time (and as few doses) as possible.    Some activities can increase your pain, such as bandage changes or therapy sessions. It may help to take your pain medicine 30 to 60 minutes " before these activities. Reduce your stress by getting enough sleep, working on hobbies you enjoy and practicing relaxation or meditation. Talk to your care team about ways to manage your pain beyond prescription opioids.    These medicines have risks:    DO NOT drive when on new or higher doses of pain medicine. These medicines can affect your alertness and reaction times, and you could be arrested for driving under the influence (DUI). If you need to use opioids long-term, talk to your care team about driving.    DO NOT operate heavy machinery    DO NOT do any other dangerous activities while taking these medicines.    DO NOT drink any alcohol while taking these medicines.     If the opioid prescribed includes acetaminophen, DO NOT take with any other medicines that contain acetaminophen. Read all labels carefully. Look for the word  acetaminophen  or  Tylenol.  Ask your pharmacist if you have questions or are unsure.    You can get addicted to pain medicines, especially if you have a history of addiction (chemical, alcohol or substance dependence). Talk to your care team about ways to reduce this risk.    All opioids tend to cause constipation. Drink plenty of water and eat foods that have a lot of fiber, such as fruits, vegetables, prune juice, apple juice and high-fiber cereal. Take a laxative (Miralax, milk of magnesia, Colace, Senna) if you don t move your bowels at least every other day. Other side effects include upset stomach, sleepiness, dizziness, throwing up, tolerance (needing more of the medicine to have the same effect), physical dependence and slowed breathing.    Store your pills in a secure place, locked if possible. We will not replace any lost or stolen medicine. If you don t finish your medicine, please throw away (dispose) as directed by your pharmacist. The [place] Pollution Control Agency has more information about safe disposal:  [email or web address]         Primary Care Provider Office Phone # Fax #    Keo Phillips PA-C [phone]       [address] [facility] [address]        Equal Access to Services     YARELIS DAWKINS : Hadii aad ku hadasho Soomaali, waaxda luqadaha, qaybta kaalmada adeegyada, waxay kazin arlenen adetrace monleninedward contreras. So [facility] [phone].    ATENCIÓN: Si habla español, tiene a ragsdale disposición servicios gratuitos de asistencia lingüística. Bryson al [phone].    We comply with applicable federal civil rights laws and [place] laws. We do not discriminate on the basis of race, color, national origin, age, disability, sex, sexual orientation, or gender identity.            Thank you!     Thank you for choosing [facility] [place]  for your care. Our goal is always to provide you with excellent care. Hearing back from our patients is one way we can continue to improve our services. Please take a few minutes to complete the written survey that you may receive in the mail after your visit with us. Thank you!             Your Updated Medication List - Protect others around you: Learn how to safely use, store and throw away your medicines at www.disposemymeds.org.          This list is accurate as of 11/19/18  9:39 AM.  Always use your most recent med list.                   Brand Name Dispense Instructions for use Diagnosis    * albuterol (2.5 MG/3ML) 0.083% neb solution     25 vial    Take 1 vial (2.5 mg) by nebulization every 6 hours as needed for shortness of breath / dyspnea or wheezing    Bronchitis with bronchospasm       * VENTOLIN  (90 Base) MCG/ACT inhaler   Generic drug:  albuterol      INL 2 PFS ITL Q 6 H PRF SOB OR DIFFICULT BREATHING        busPIRone 15 MG tablet    BUSPAR    180 tablet    Take 1 tablet (15 mg) by mouth 2 times daily        CLONAZEPAM PO      Take  by mouth 2 times daily.        cyclobenzaprine 10 MG  tablet    FLEXERIL    30 tablet    One tab at bedtime as needed    Lumbar radiculopathy, Chronic low back pain with sciatica, sciatica laterality unspecified, unspecified back pain laterality       * methylphenidate ER 27 MG CR tablet    CONCERTA     27 mg        * methylphenidate ER 27 MG 24H tablet           nabumetone 750 MG tablet    RELAFEN    60 tablet    Take 1 tablet (750 mg) by mouth 2 times daily as needed for moderate pain    Lumbar radiculopathy, Chronic low back pain with sciatica, sciatica laterality unspecified, unspecified back pain laterality       predniSONE 20 MG tablet    DELTASONE    10 tablet    2 tab daily for 5 days    Lumbar radiculopathy, Chronic low back pain with sciatica, sciatica laterality unspecified, unspecified back pain laterality       sertraline 100 MG tablet    ZOLOFT    60 tablet    Take 2 tablets (200 mg) by mouth daily    Major depressive disorder in partial remission, unspecified whether recurrent (H)       traMADol 50 MG tablet    ULTRAM    30 tablet    1 to 2 tablets every 8 hours for pain    Lumbar radiculopathy, Chronic low back pain with sciatica, sciatica laterality unspecified, unspecified back pain laterality       * Notice:  This list has 4 medication(s) that are the same as other medications prescribed for you. Read the directions carefully, and ask your doctor or other care provider to review them with you.

## 2018-11-28 ENCOUNTER — THERAPY VISIT (OUTPATIENT)
Dept: CHIROPRACTIC MEDICINE | Facility: CLINIC | Age: 38
End: 2018-11-28
Payer: COMMERCIAL

## 2018-11-28 DIAGNOSIS — M54.50 LUMBAGO: ICD-10-CM

## 2018-11-28 DIAGNOSIS — M54.2 CERVICALGIA: ICD-10-CM

## 2018-11-28 DIAGNOSIS — G89.29 CHRONIC LEFT-SIDED LOW BACK PAIN WITH LEFT-SIDED SCIATICA: ICD-10-CM

## 2018-11-28 DIAGNOSIS — M54.42 ACUTE LEFT-SIDED LOW BACK PAIN WITH LEFT-SIDED SCIATICA: ICD-10-CM

## 2018-11-28 DIAGNOSIS — M54.42 CHRONIC LEFT-SIDED LOW BACK PAIN WITH LEFT-SIDED SCIATICA: ICD-10-CM

## 2018-11-28 DIAGNOSIS — M62.838 SPASM OF MUSCLE: ICD-10-CM

## 2018-11-28 DIAGNOSIS — M99.02 THORACIC SEGMENT DYSFUNCTION: ICD-10-CM

## 2018-11-28 DIAGNOSIS — M99.03 SEGMENTAL DYSFUNCTION OF LUMBAR REGION: ICD-10-CM

## 2018-11-28 DIAGNOSIS — M99.05 SEGMENTAL DYSFUNCTION OF PELVIC REGION: Primary | ICD-10-CM

## 2018-11-28 DIAGNOSIS — M99.01 CERVICAL SEGMENT DYSFUNCTION: ICD-10-CM

## 2018-11-28 PROCEDURE — 98941 CHIROPRACT MANJ 3-4 REGIONS: CPT | Mod: AT | Performed by: CHIROPRACTOR

## 2018-11-28 PROCEDURE — 99203 OFFICE O/P NEW LOW 30 MIN: CPT | Mod: 25 | Performed by: CHIROPRACTOR
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
## 2019-01-25 DIAGNOSIS — M54.40 CHRONIC LOW BACK PAIN WITH SCIATICA, SCIATICA LATERALITY UNSPECIFIED, UNSPECIFIED BACK PAIN LATERALITY: ICD-10-CM

## 2019-01-25 DIAGNOSIS — G89.29 CHRONIC LOW BACK PAIN WITH SCIATICA, SCIATICA LATERALITY UNSPECIFIED, UNSPECIFIED BACK PAIN LATERALITY: ICD-10-CM

## 2019-01-25 DIAGNOSIS — M54.16 LUMBAR RADICULOPATHY: ICD-10-CM

## 2019-01-25 NOTE — TELEPHONE ENCOUNTER
Routing refill request to provider for review/approval because:  Labs not current:  Creatinine  Nikky Marítnez, [facility] Triage.

## 2019-06-27 ENCOUNTER — OFFICE VISIT (OUTPATIENT)
Dept: FAMILY MEDICINE | Facility: CLINIC | Age: 39
End: 2019-06-27
Payer: COMMERCIAL

## 2019-06-27 VITALS
TEMPERATURE: 98.1 F | BODY MASS INDEX: 33.26 KG/M2 | DIASTOLIC BLOOD PRESSURE: 70 MMHG | HEART RATE: 98 BPM | WEIGHT: 222 LBS | SYSTOLIC BLOOD PRESSURE: 124 MMHG

## 2019-06-27 DIAGNOSIS — H66.001 NON-RECURRENT ACUTE SUPPURATIVE OTITIS MEDIA OF RIGHT EAR WITHOUT SPONTANEOUS RUPTURE OF TYMPANIC MEMBRANE: Primary | ICD-10-CM

## 2019-06-27 DIAGNOSIS — J98.01 BRONCHOSPASM: ICD-10-CM

## 2019-06-27 DIAGNOSIS — M77.01 GOLFER'S ELBOW, RIGHT: ICD-10-CM

## 2019-06-27 PROCEDURE — 99213 OFFICE O/P EST LOW 20 MIN: CPT | Performed by: NURSE PRACTITIONER

## 2019-06-27 RX ORDER — ALBUTEROL SULFATE 0.83 MG/ML
2.5 SOLUTION RESPIRATORY (INHALATION) EVERY 6 HOURS PRN
Qty: 25 VIAL | Refills: 0 | Status: CANCELLED | OUTPATIENT
Start: 2019-06-27

## 2019-06-27 RX ORDER — ALBUTEROL SULFATE 90 UG/1
AEROSOL, METERED RESPIRATORY (INHALATION)
Qty: 8.5 G | Refills: 0 | Status: SHIPPED | OUTPATIENT
Start: 2019-06-27 | End: 2019-07-30

## 2019-06-27 ASSESSMENT — PAIN SCALES - GENERAL: PAINLEVEL: MODERATE PAIN (4)
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
## 2019-06-27 NOTE — PATIENT INSTRUCTIONS
Patient Education     Understanding Medial Epicondylitis    Several muscles attach to the arm at the elbow joint. The tough bands of tissue that attach muscle to bones are called tendons. The bone in the upper arm has knobs on the farthest end called epicondyles. Tendons attach some arm muscles to these knobs. The tissues in this area can become irritated.  Epicondylitis is the medical term for a painful elbow over the epicondyle. Medial refers to the inner side of the elbow. Medial epicondylitis is sometimes called  golfer s elbow.      How to say it  [id number] [id number]   Causes of medial epicondylitis  A painful inner elbow may be caused by:    Using an elbow or hand the same way over and over    Using poor form or too much force in a sport such as golf, tennis, or baseball    Lifting too heavy a weight    Other injuries to the arm or elbow  Symptoms of medial epicondylitis    Pain or tenderness on the inside of the elbow that may travel down the forearm    Pain when moving the wrist    Pain or weakness when gripping something    A crackling sound or grating feeling when moving the elbow  Treatment for medial epicondylitis  Treatments may include:    Avoiding or changing the action that caused the problem. This helps prevent irritating the tissues more.    Prescription or over-the-counter pain medicines. These help reduce inflammation, swelling, and pain.    Cold or heat packs. These help reduce pain and swelling.    Stretching and other exercises. These improve flexibility and strength.    Physical therapy. This may include exercises or other treatments.    Injections of medicine. This may relieve symptoms.  If other treatments do not relieve symptoms, you may need surgery.  Possible complications  If you don t give your elbow time to heal, symptoms may return or get worse. Follow your healthcare provider s instructions on resting and treating your elbow.     When to call your healthcare  provider  Call your healthcare provider right away if you have any of these:    Fever of 100.4 F (38 C) or higher, or as directed    Redness, swelling, or warmth that gets worse    Symptoms that don t get better with prescribed medicines, or get worse    New symptoms   Date Last Reviewed: 3/10/2016    5839-6485 [employer]. [address] [place] [address], [place] [address]. All rights reserved. This information is not intended as a substitute for professional medical care. Always follow your healthcare professional's instructions.

## 2019-07-26 DIAGNOSIS — J98.01 BRONCHOSPASM: ICD-10-CM

## 2019-07-26 NOTE — TELEPHONE ENCOUNTER
"Requested Prescriptions   Pending Prescriptions Disp Refills     VENTOLIN  (90 Base) MCG/ACT inhaler  Last Written Prescription Date:  2019  Last Fill Quantity: 8.5 g,  # refills: 0   Last office visit: 2019 with prescribing provider:  JESSICA Mcrae     Future Office Visit:   Next 5 appointments (look out 90 days)    Aug 01, 2019 12:40 PM CDT  PHYSICAL with Keo Phillips PA-C  [facility] ([address] [address] [phone]          8.5 g 0     Si puffs every 6 hours as need for shortness of breath       Asthma Maintenance Inhalers - Anticholinergics Passed - 2019  7:00 AM        Passed - Patient is age 12 years or older        Passed - Recent (12 mo) or future (30 days) visit within the authorizing provider's specialty     Patient had office visit in the last 12 months or has a visit in the next 30 days with authorizing provider or within the authorizing provider's specialty.  See \"Patient Info\" tab in inbasket, or \"Choose Columns\" in Meds & Orders section of the refill encounter.              Passed - Medication is active on med list          "

## 2019-07-30 RX ORDER — ALBUTEROL SULFATE 90 UG/1
AEROSOL, METERED RESPIRATORY (INHALATION)
Qty: 8.5 G | Refills: 0 | Status: SHIPPED | OUTPATIENT
Start: 2019-07-30 | End: 2019-09-10

## 2019-08-01 ENCOUNTER — OFFICE VISIT (OUTPATIENT)
Dept: FAMILY MEDICINE | Facility: CLINIC | Age: 39
End: 2019-08-01
Payer: COMMERCIAL

## 2019-08-01 VITALS
DIASTOLIC BLOOD PRESSURE: 74 MMHG | SYSTOLIC BLOOD PRESSURE: 118 MMHG | HEART RATE: 68 BPM | BODY MASS INDEX: 33.71 KG/M2 | HEIGHT: 68 IN | WEIGHT: 222.4 LBS | TEMPERATURE: 98.5 F

## 2019-08-01 DIAGNOSIS — M25.551 HIP PAIN, RIGHT: ICD-10-CM

## 2019-08-01 DIAGNOSIS — R39.9 LOWER URINARY TRACT SYMPTOMS (LUTS): ICD-10-CM

## 2019-08-01 DIAGNOSIS — M25.561 RIGHT KNEE PAIN, UNSPECIFIED CHRONICITY: Primary | ICD-10-CM

## 2019-08-01 DIAGNOSIS — G89.29 CHRONIC RIGHT-SIDED LOW BACK PAIN WITHOUT SCIATICA: ICD-10-CM

## 2019-08-01 DIAGNOSIS — R20.2 PARESTHESIA: ICD-10-CM

## 2019-08-01 DIAGNOSIS — M54.50 CHRONIC RIGHT-SIDED LOW BACK PAIN WITHOUT SCIATICA: ICD-10-CM

## 2019-08-01 DIAGNOSIS — M77.01 GOLFER'S ELBOW, RIGHT: ICD-10-CM

## 2019-08-01 LAB
CRP SERPL-MCNC: <2.9 MG/L (ref 0–8)
ERYTHROCYTE [SEDIMENTATION RATE] IN BLOOD BY WESTERGREN METHOD: 7 MM/H (ref 0–15)

## 2019-08-01 PROCEDURE — 86200 CCP ANTIBODY: CPT | Performed by: PHYSICIAN ASSISTANT

## 2019-08-01 PROCEDURE — 86140 C-REACTIVE PROTEIN: CPT | Performed by: PHYSICIAN ASSISTANT

## 2019-08-01 PROCEDURE — 86038 ANTINUCLEAR ANTIBODIES: CPT | Performed by: PHYSICIAN ASSISTANT

## 2019-08-01 PROCEDURE — 99214 OFFICE O/P EST MOD 30 MIN: CPT | Performed by: PHYSICIAN ASSISTANT

## 2019-08-01 PROCEDURE — 85652 RBC SED RATE AUTOMATED: CPT | Performed by: PHYSICIAN ASSISTANT

## 2019-08-01 PROCEDURE — 86431 RHEUMATOID FACTOR QUANT: CPT | Performed by: PHYSICIAN ASSISTANT

## 2019-08-01 PROCEDURE — 36415 COLL VENOUS BLD VENIPUNCTURE: CPT | Performed by: PHYSICIAN ASSISTANT

## 2019-08-01 RX ORDER — MELOXICAM 15 MG/1
15 TABLET ORAL DAILY
Qty: 30 TABLET | Refills: 2 | Status: SHIPPED | OUTPATIENT
Start: 2019-08-01 | End: 2020-07-28

## 2019-08-01 ASSESSMENT — ENCOUNTER SYMPTOMS
CHILLS: 0
COUGH: 0
FEVER: 0
NAUSEA: 0
DIARRHEA: 0
DIZZINESS: 0
CONSTIPATION: 0
FREQUENCY: 1
SORE THROAT: 0
ARTHRALGIAS: 1
HEADACHES: 1
WEAKNESS: 1
JOINT SWELLING: 1
HEARTBURN: 0
EYE PAIN: 0
HEMATOCHEZIA: 0
SHORTNESS OF BREATH: 0
HEMATURIA: 0
PARESTHESIAS: 0
PALPITATIONS: 0
DYSURIA: 0
NERVOUS/ANXIOUS: 1
MYALGIAS: 1
ABDOMINAL PAIN: 0

## 2019-08-01 ASSESSMENT — PATIENT HEALTH QUESTIONNAIRE - PHQ9
10. IF YOU CHECKED OFF ANY PROBLEMS, HOW DIFFICULT HAVE THESE PROBLEMS MADE IT FOR YOU TO DO YOUR WORK, TAKE CARE OF THINGS AT HOME, OR GET ALONG WITH OTHER PEOPLE: EXTREMELY DIFFICULT
SUM OF ALL RESPONSES TO PHQ QUESTIONS 1-9: 21
SUM OF ALL RESPONSES TO PHQ QUESTIONS 1-9: 21

## 2019-08-01 ASSESSMENT — MIFFLIN-ST. JEOR: SCORE: 1903.17
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
## 2019-08-02 LAB
ANA SER QL IF: NEGATIVE
CCP AB SER IA-ACNC: <1 U/ML
RHEUMATOID FACT SER NEPH-ACNC: <20 IU/ML (ref 0–20)

## 2019-08-02 ASSESSMENT — PATIENT HEALTH QUESTIONNAIRE - PHQ9: SUM OF ALL RESPONSES TO PHQ QUESTIONS 1-9: 21

## 2019-09-04 DIAGNOSIS — J98.01 BRONCHOSPASM: ICD-10-CM

## 2019-09-04 NOTE — TELEPHONE ENCOUNTER
"Requested Prescriptions   Pending Prescriptions Disp Refills     VENTOLIN  (90 Base) MCG/ACT inhaler  Last Written Prescription Date:  2019  Last Fill Quantity: 8.5 g,  # refills: 0   Last office visit: 2019 with prescribing provider:  JESSICA Mcrae   Future Office Visit:     8.5 g 0     Si puffs every 6 hours as need for shortness of breath       Asthma Maintenance Inhalers - Anticholinergics Passed - 2019  4:31 PM        Passed - Patient is age 12 years or older        Passed - Recent (12 mo) or future (30 days) visit within the authorizing provider's specialty     Patient had office visit in the last 12 months or has a visit in the next 30 days with authorizing provider or within the authorizing provider's specialty.  See \"Patient Info\" tab in inbasket, or \"Choose Columns\" in Meds & Orders section of the refill encounter.              Passed - Medication is active on med list          "

## 2019-09-10 RX ORDER — ALBUTEROL SULFATE 90 UG/1
AEROSOL, METERED RESPIRATORY (INHALATION)
Qty: 8.5 G | Refills: 0 | Status: SHIPPED | OUTPATIENT
Start: 2019-09-10 | End: 2021-06-07

## 2019-09-10 NOTE — TELEPHONE ENCOUNTER
Prescription approved per [facility].    Ryan Pascal RN

## 2019-12-08 ENCOUNTER — HEALTH MAINTENANCE LETTER (OUTPATIENT)
Age: 39
End: 2019-12-08

## 2020-02-11 ENCOUNTER — OFFICE VISIT (OUTPATIENT)
Dept: FAMILY MEDICINE | Facility: CLINIC | Age: 40
End: 2020-02-11
Payer: COMMERCIAL

## 2020-02-11 VITALS
SYSTOLIC BLOOD PRESSURE: 124 MMHG | DIASTOLIC BLOOD PRESSURE: 84 MMHG | WEIGHT: 230 LBS | BODY MASS INDEX: 34.66 KG/M2 | TEMPERATURE: 98.2 F

## 2020-02-11 DIAGNOSIS — F32.1 MODERATE MAJOR DEPRESSION (H): ICD-10-CM

## 2020-02-11 DIAGNOSIS — Z13.6 CARDIOVASCULAR SCREENING; LDL GOAL LESS THAN 130: ICD-10-CM

## 2020-02-11 DIAGNOSIS — M25.551 HIP PAIN, RIGHT: ICD-10-CM

## 2020-02-11 DIAGNOSIS — F19.11 SUBSTANCE ABUSE IN REMISSION (H): ICD-10-CM

## 2020-02-11 DIAGNOSIS — E78.1 HYPERTRIGLYCERIDEMIA: ICD-10-CM

## 2020-02-11 DIAGNOSIS — D17.30 LIPOMA OF SKIN AND SUBCUTANEOUS TISSUE: ICD-10-CM

## 2020-02-11 DIAGNOSIS — Z00.00 ROUTINE GENERAL MEDICAL EXAMINATION AT A HEALTH CARE FACILITY: Primary | ICD-10-CM

## 2020-02-11 PROCEDURE — 82947 ASSAY GLUCOSE BLOOD QUANT: CPT | Performed by: NURSE PRACTITIONER

## 2020-02-11 PROCEDURE — 80061 LIPID PANEL: CPT | Performed by: NURSE PRACTITIONER

## 2020-02-11 PROCEDURE — 99213 OFFICE O/P EST LOW 20 MIN: CPT | Mod: 25 | Performed by: NURSE PRACTITIONER

## 2020-02-11 PROCEDURE — 99396 PREV VISIT EST AGE 40-64: CPT | Performed by: NURSE PRACTITIONER

## 2020-02-11 PROCEDURE — 83721 ASSAY OF BLOOD LIPOPROTEIN: CPT | Mod: 59 | Performed by: NURSE PRACTITIONER

## 2020-02-11 PROCEDURE — 36415 COLL VENOUS BLD VENIPUNCTURE: CPT | Performed by: NURSE PRACTITIONER

## 2020-02-11 ASSESSMENT — ANXIETY QUESTIONNAIRES
7. FEELING AFRAID AS IF SOMETHING AWFUL MIGHT HAPPEN: MORE THAN HALF THE DAYS
1. FEELING NERVOUS, ANXIOUS, OR ON EDGE: SEVERAL DAYS
2. NOT BEING ABLE TO STOP OR CONTROL WORRYING: SEVERAL DAYS
5. BEING SO RESTLESS THAT IT IS HARD TO SIT STILL: MORE THAN HALF THE DAYS
GAD7 TOTAL SCORE: 12
6. BECOMING EASILY ANNOYED OR IRRITABLE: SEVERAL DAYS
3. WORRYING TOO MUCH ABOUT DIFFERENT THINGS: MORE THAN HALF THE DAYS

## 2020-02-11 ASSESSMENT — PATIENT HEALTH QUESTIONNAIRE - PHQ9
5. POOR APPETITE OR OVEREATING: NEARLY EVERY DAY
SUM OF ALL RESPONSES TO PHQ QUESTIONS 1-9: 14
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
## 2020-02-12 PROBLEM — E78.1 HYPERTRIGLYCERIDEMIA: Status: ACTIVE | Noted: 2020-02-12

## 2020-02-12 LAB
CHOLEST SERPL-MCNC: 239 MG/DL
GLUCOSE SERPL-MCNC: 115 MG/DL (ref 70–99)
HDLC SERPL-MCNC: 19 MG/DL
LDLC SERPL CALC-MCNC: ABNORMAL MG/DL
LDLC SERPL DIRECT ASSAY-MCNC: 84 MG/DL
NONHDLC SERPL-MCNC: 220 MG/DL
TRIGL SERPL-MCNC: 886 MG/DL

## 2020-02-12 RX ORDER — ROSUVASTATIN CALCIUM 20 MG/1
20 TABLET, COATED ORAL DAILY
Status: CANCELLED | OUTPATIENT
Start: 2020-02-12

## 2020-02-12 RX ORDER — METAPROTERENOL SULFATE 10 MG
500 TABLET ORAL DAILY
Qty: 90 CAPSULE | Refills: 3 | Status: SHIPPED | OUTPATIENT
Start: 2020-02-12 | End: 2020-07-28

## 2020-02-12 RX ORDER — FENOFIBRATE 48 MG/1
48 TABLET, COATED ORAL DAILY
Qty: 90 TABLET | Refills: 0 | Status: SHIPPED | OUTPATIENT
Start: 2020-02-12 | End: 2020-07-28

## 2020-02-12 ASSESSMENT — ANXIETY QUESTIONNAIRES: GAD7 TOTAL SCORE: 12

## 2020-07-28 ENCOUNTER — VIRTUAL VISIT (OUTPATIENT)
Dept: FAMILY MEDICINE | Facility: CLINIC | Age: 40
End: 2020-07-28
Payer: COMMERCIAL

## 2020-07-28 DIAGNOSIS — Z20.822 EXPOSURE TO COVID-19 VIRUS: Primary | ICD-10-CM

## 2020-07-28 PROCEDURE — 99213 OFFICE O/P EST LOW 20 MIN: CPT | Mod: 95 | Performed by: PHYSICIAN ASSISTANT

## 2020-07-28 RX ORDER — PRAZOSIN HYDROCHLORIDE 1 MG/1
CAPSULE ORAL
COMMUNITY
Start: 2020-03-22 | End: 2021-06-10
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
## 2020-10-22 ENCOUNTER — APPOINTMENT (OUTPATIENT)
Dept: URBAN - METROPOLITAN AREA CLINIC 252 | Age: 40
Setting detail: DERMATOLOGY
End: 2020-10-22

## 2020-10-22 VITALS — WEIGHT: 225 LBS | HEIGHT: 68 IN | RESPIRATION RATE: 18 BRPM

## 2020-10-22 DIAGNOSIS — D17 BENIGN LIPOMATOUS NEOPLASM: ICD-10-CM

## 2020-10-22 DIAGNOSIS — L82.1 OTHER SEBORRHEIC KERATOSIS: ICD-10-CM

## 2020-10-22 DIAGNOSIS — B36.0 PITYRIASIS VERSICOLOR: ICD-10-CM

## 2020-10-22 DIAGNOSIS — D18.0 HEMANGIOMA: ICD-10-CM

## 2020-10-22 PROBLEM — D17.1 BENIGN LIPOMATOUS NEOPLASM OF SKIN AND SUBCUTANEOUS TISSUE OF TRUNK: Status: ACTIVE | Noted: 2020-10-22

## 2020-10-22 PROBLEM — D18.01 HEMANGIOMA OF SKIN AND SUBCUTANEOUS TISSUE: Status: ACTIVE | Noted: 2020-10-22

## 2020-10-22 PROCEDURE — OTHER PRESCRIPTION: OTHER

## 2020-10-22 PROCEDURE — OTHER COUNSELING: OTHER

## 2020-10-22 PROCEDURE — 99203 OFFICE O/P NEW LOW 30 MIN: CPT

## 2020-10-22 RX ORDER — FLUCONAZOLE 200 MG/1
400MG TABLET ORAL
Qty: 4 | Refills: 1 | Status: ERX | COMMUNITY
Start: 2020-10-22

## 2020-10-22 ASSESSMENT — LOCATION DETAILED DESCRIPTION DERM
LOCATION DETAILED: RIGHT POSTERIOR NECK
LOCATION DETAILED: LEFT INFERIOR LATERAL NECK
LOCATION DETAILED: LEFT LATERAL ABDOMEN
LOCATION DETAILED: RIGHT MEDIAL UPPER BACK
LOCATION DETAILED: RIGHT RIB CAGE
LOCATION DETAILED: RIGHT INFERIOR LATERAL NECK
LOCATION DETAILED: LEFT RIB CAGE
LOCATION DETAILED: RIGHT CENTRAL MALAR CHEEK
LOCATION DETAILED: RIGHT SUPERIOR UPPER BACK

## 2020-10-22 ASSESSMENT — LOCATION ZONE DERM
LOCATION ZONE: FACE
LOCATION ZONE: NECK
LOCATION ZONE: TRUNK

## 2020-10-22 ASSESSMENT — LOCATION SIMPLE DESCRIPTION DERM
LOCATION SIMPLE: POSTERIOR NECK
LOCATION SIMPLE: RIGHT CHEEK
LOCATION SIMPLE: RIGHT ANTERIOR NECK
LOCATION SIMPLE: ABDOMEN
LOCATION SIMPLE: LEFT ANTERIOR NECK
LOCATION SIMPLE: RIGHT UPPER BACK

## 2020-10-22 NOTE — HPI: RASH
Is This A New Presentation, Or A Follow-Up?: Rash
Additional History: Has a history of tinea versicolor that responded well to oral fluconazole.

## 2020-10-22 NOTE — PROCEDURE: COUNSELING
Detail Level: Simple
Detail Level: Detailed
Patient Specific Counseling (Will Not Stick From Patient To Patient): Patient feels like these lesions have been \"poping up more\". Discussed that the lesion to the left trunk appears to look and feel like a benign lipoma.   Discussed that the first line treatment is to inject the lipoma as it can shrink it in size, does not resolve the lesion, but if the injection is not effective, we can excise .  Advised him not to treat unless the lesions are symptomatic as removal of the lesions do carry risks. Patient report his dad had hundreds pf these treated in past.
Detail Level: Zone

## 2021-01-10 ENCOUNTER — HEALTH MAINTENANCE LETTER (OUTPATIENT)
Age: 41
End: 2021-01-10

## 2021-03-13 ENCOUNTER — HEALTH MAINTENANCE LETTER (OUTPATIENT)
Age: 41
End: 2021-03-13

## 2021-06-07 ENCOUNTER — OFFICE VISIT (OUTPATIENT)
Dept: FAMILY MEDICINE | Facility: CLINIC | Age: 41
End: 2021-06-07
Payer: COMMERCIAL

## 2021-06-07 VITALS
SYSTOLIC BLOOD PRESSURE: 136 MMHG | HEIGHT: 68 IN | WEIGHT: 224.8 LBS | TEMPERATURE: 98.5 F | OXYGEN SATURATION: 97 % | BODY MASS INDEX: 34.07 KG/M2 | HEART RATE: 75 BPM | DIASTOLIC BLOOD PRESSURE: 86 MMHG

## 2021-06-07 DIAGNOSIS — F19.11 HISTORY OF SUBSTANCE ABUSE (H): ICD-10-CM

## 2021-06-07 DIAGNOSIS — Z63.6 CAREGIVER STRESS: Primary | ICD-10-CM

## 2021-06-07 DIAGNOSIS — F32.1 MODERATE MAJOR DEPRESSION (H): ICD-10-CM

## 2021-06-07 DIAGNOSIS — J98.01 BRONCHOSPASM: ICD-10-CM

## 2021-06-07 PROCEDURE — 99213 OFFICE O/P EST LOW 20 MIN: CPT | Performed by: PHYSICIAN ASSISTANT

## 2021-06-07 RX ORDER — ALBUTEROL SULFATE 90 UG/1
AEROSOL, METERED RESPIRATORY (INHALATION)
Qty: 8.5 G | Refills: 0 | Status: SHIPPED | OUTPATIENT
Start: 2021-06-07 | End: 2021-09-17

## 2021-06-07 SDOH — SOCIAL STABILITY - SOCIAL INSECURITY: DEPENDENT RELATIVE NEEDING CARE AT HOME: Z63.6

## 2021-06-07 ASSESSMENT — PAIN SCALES - GENERAL: PAINLEVEL: MODERATE PAIN (5)

## 2021-06-07 ASSESSMENT — MIFFLIN-ST. JEOR: SCORE: 1904.06
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
## 2021-06-08 ENCOUNTER — TELEPHONE (OUTPATIENT)
Dept: FAMILY MEDICINE | Facility: CLINIC | Age: 41
End: 2021-06-08

## 2021-06-08 NOTE — CONFIDENTIAL NOTE
Reason for Call:  Form, our goal is to have forms completed with 72 hours, however, some forms may require a visit or additional information.    Type of letter, form or note:  [place]    Who is the form from?:  [facility]     Where did the form come from: Patient or family brought in       What clinic location was the form placed at?: [facility])    Where the form was placed: Team Silver Box/Folder    What number is listed as a contact on the form?: [phone]     (Additional comments:  Patient had an in person visit on 06/07/2021 with Marciano Phillips.  Please fax the form to: [phone], [facility], Attention: Leave Services   Patient would like to  the original once it has been faxed.  Call [phone].   Thank you!    Call taken on 6/8/2021 at 8:39 AM by Sana Rodriguez

## 2021-06-08 NOTE — TELEPHONE ENCOUNTER
Form signed and faxed. Copy made for chart. Original placed at front for patient to . Patient notified.    Thank you,  Deepa ZELAYA    [facility]

## 2021-06-10 ENCOUNTER — OFFICE VISIT (OUTPATIENT)
Dept: FAMILY MEDICINE | Facility: CLINIC | Age: 41
End: 2021-06-10
Payer: COMMERCIAL

## 2021-06-10 VITALS
OXYGEN SATURATION: 97 % | BODY MASS INDEX: 33.74 KG/M2 | HEIGHT: 68 IN | DIASTOLIC BLOOD PRESSURE: 76 MMHG | HEART RATE: 79 BPM | SYSTOLIC BLOOD PRESSURE: 118 MMHG | TEMPERATURE: 98.2 F | WEIGHT: 222.6 LBS

## 2021-06-10 DIAGNOSIS — M54.2 CERVICALGIA: ICD-10-CM

## 2021-06-10 DIAGNOSIS — R20.0 NUMBNESS AND TINGLING IN LEFT HAND: ICD-10-CM

## 2021-06-10 DIAGNOSIS — R20.2 NUMBNESS AND TINGLING IN LEFT HAND: ICD-10-CM

## 2021-06-10 DIAGNOSIS — M25.551 HIP PAIN, RIGHT: Primary | ICD-10-CM

## 2021-06-10 PROCEDURE — 99214 OFFICE O/P EST MOD 30 MIN: CPT | Performed by: PHYSICIAN ASSISTANT

## 2021-06-10 ASSESSMENT — PATIENT HEALTH QUESTIONNAIRE - PHQ9: SUM OF ALL RESPONSES TO PHQ QUESTIONS 1-9: 20

## 2021-06-10 ASSESSMENT — MIFFLIN-ST. JEOR: SCORE: 1894.13

## 2021-06-10 NOTE — PATIENT INSTRUCTIONS
To Schedule Imaging   [facility] Scheduling Phone Number: [phone]  Or   [facility] Scheduling Phone number: [phone]  Or  [facility] Scheduling Phone number: [phone]
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
## 2021-06-11 NOTE — TELEPHONE ENCOUNTER
"Received form back with a note \"the form in incomplete. Please completed question 1 to indicate a serious elizabeth condition/diagnosis\"    Placed in Marciano Phillips sign me folder.    .DEMARIO Rousseau  [facility]    "

## 2021-09-17 ENCOUNTER — VIRTUAL VISIT (OUTPATIENT)
Dept: FAMILY MEDICINE | Facility: CLINIC | Age: 41
End: 2021-09-17
Payer: COMMERCIAL

## 2021-09-17 DIAGNOSIS — Z71.6 ENCOUNTER FOR TOBACCO USE CESSATION COUNSELING: ICD-10-CM

## 2021-09-17 DIAGNOSIS — J98.01 BRONCHOSPASM: Primary | ICD-10-CM

## 2021-09-17 PROCEDURE — 99213 OFFICE O/P EST LOW 20 MIN: CPT | Mod: 95 | Performed by: PHYSICIAN ASSISTANT

## 2021-09-17 RX ORDER — NICOTINE 21 MG/24HR
1 PATCH, TRANSDERMAL 24 HOURS TRANSDERMAL EVERY 24 HOURS
Qty: 30 PATCH | Refills: 0 | Status: SHIPPED | OUTPATIENT
Start: 2021-09-17 | End: 2023-07-18

## 2021-09-17 RX ORDER — ALBUTEROL SULFATE 90 UG/1
AEROSOL, METERED RESPIRATORY (INHALATION)
Qty: 8.5 G | Refills: 5 | Status: SHIPPED | OUTPATIENT
Start: 2021-09-17 | End: 2023-02-07
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
## 2021-10-23 ENCOUNTER — HEALTH MAINTENANCE LETTER (OUTPATIENT)
Age: 41
End: 2021-10-23

## 2022-04-08 ENCOUNTER — ANCILLARY PROCEDURE (OUTPATIENT)
Dept: ULTRASOUND IMAGING | Facility: CLINIC | Age: 42
End: 2022-04-08
Attending: NURSE PRACTITIONER
Payer: COMMERCIAL

## 2022-04-08 ENCOUNTER — OFFICE VISIT (OUTPATIENT)
Dept: FAMILY MEDICINE | Facility: CLINIC | Age: 42
End: 2022-04-08
Payer: COMMERCIAL

## 2022-04-08 VITALS
BODY MASS INDEX: 32.09 KG/M2 | OXYGEN SATURATION: 98 % | HEART RATE: 68 BPM | WEIGHT: 213 LBS | TEMPERATURE: 98.8 F | DIASTOLIC BLOOD PRESSURE: 74 MMHG | SYSTOLIC BLOOD PRESSURE: 118 MMHG

## 2022-04-08 DIAGNOSIS — G89.29 CHRONIC PAIN OF RIGHT KNEE: ICD-10-CM

## 2022-04-08 DIAGNOSIS — M25.561 CHRONIC PAIN OF RIGHT KNEE: ICD-10-CM

## 2022-04-08 DIAGNOSIS — M79.661 RIGHT CALF PAIN: Primary | ICD-10-CM

## 2022-04-08 DIAGNOSIS — M79.661 RIGHT CALF PAIN: ICD-10-CM

## 2022-04-08 DIAGNOSIS — M71.21 BAKER'S CYST OF KNEE, RIGHT: ICD-10-CM

## 2022-04-08 PROCEDURE — 99213 OFFICE O/P EST LOW 20 MIN: CPT | Performed by: NURSE PRACTITIONER

## 2022-04-08 PROCEDURE — 93971 EXTREMITY STUDY: CPT | Mod: RT | Performed by: RADIOLOGY

## 2022-04-08 ASSESSMENT — PATIENT HEALTH QUESTIONNAIRE - PHQ9
SUM OF ALL RESPONSES TO PHQ QUESTIONS 1-9: 12
10. IF YOU CHECKED OFF ANY PROBLEMS, HOW DIFFICULT HAVE THESE PROBLEMS MADE IT FOR YOU TO DO YOUR WORK, TAKE CARE OF THINGS AT HOME, OR GET ALONG WITH OTHER PEOPLE: VERY DIFFICULT
SUM OF ALL RESPONSES TO PHQ QUESTIONS 1-9: 12

## 2022-04-08 NOTE — RESULT ENCOUNTER NOTE
Please call the patient with these results:    Geovani, your ultrasound does not show a bood clot. There is a large cyst behind the knee. I've placed a referral for you to see Orthopedics or Sports Medicine for further treatment of this. You should be called within 1-2 business days.     You can also reach out to the [facility] for a sports medicine appointment if you prefer- they typically have good availability.    Yasmin Siddiqui, CNP

## 2022-04-08 NOTE — PROGRESS NOTES
"  Assessment & Plan     Right calf pain  Prudent to r/o DVT, although likely this is related to ruptured baker's cyst. If US negative for DVT, will refer to Ortho.  - US Lower Extremity Venous Duplex Right; Future    Chronic pain of right knee  As above          {Provider  Link to [facility] Help Grid :860574}     Tobacco Cessation:   reports that he has been smoking cigarettes. He has been smoking about 0.50 packs per day. He has never used smokeless tobacco.      BMI:   Estimated body mass index is 32.09 kg/m  as calculated from the following:    Height as of 6/10/21: 1.735 m (5' 8.31\").    Weight as of this encounter: 96.6 kg (213 lb).           No follow-ups on file.    MAIRA Damian CNP  [facility] DOROTA Olivo is a 41 year old who presents for the following health issues  accompanied by his self.    History of Present Illness       Mental Health Follow-up:                    Today's PHQ-9         PHQ-9 Total Score: 12  PHQ-9 Q9 Thoughts of better off dead/self-harm past 2 weeks :   (P) Not at all    How difficult have these problems made it for you to do your work, take care of things at home, or get along with other people: Very difficult        Reason for visit:  Knee /calf issue  Symptom onset:  3-7 days ago  Symptoms include:  Bruised calf, meniscus pain and bulge in back of knee  Symptom intensity:  Severe  Symptom progression:  Worsening  Had these symptoms before:  No  What makes it worse:  Walking and laying down  What makes it better:  Momentary relief switching positions    He eats 0-1 servings of fruits and vegetables daily.He consumes 4 sweetened beverage(s) daily.He exercises with enough effort to increase his heart rate 60 or more minutes per day.  He exercises with enough effort to increase his heart rate 7 days per week.   He is taking medications regularly.       Patient complains of bruising, tenderness, and swelling of right calf for the last few " "days. Has associated chronic right knee pain and felt there was a \"golfball\" behind the knee recently, although this has resolved. No recent injuries. States he has chronic pain related to torn meniscus in this knee s/p surgery 2017. Upon further chart review, knee surgery in 2017 was for left knee. No personal or familial history DVT.      Review of Systems   Constitutional, HEENT, cardiovascular, pulmonary, gi and gu systems are negative, except as otherwise noted.      Objective    /74 (BP Location: Left arm, Patient Position: Sitting, Cuff Size: Adult Large)   Pulse 68   Temp 98.8  F (37.1  C) (Oral)   Wt 96.6 kg (213 lb)   SpO2 98%   BMI 32.09 kg/m    Body mass index is 32.09 kg/m .  Physical Exam   GENERAL: healthy, alert and no distress  MS: Right knee mildly tender popliteal fossa, positive Gil.   Right calf tender, mild edema, no erythema or palpable cord    No results found for this or any previous visit (from the past 24 hour(s)).            "

## 2022-04-09 ENCOUNTER — HEALTH MAINTENANCE LETTER (OUTPATIENT)
Age: 42
End: 2022-04-09

## 2022-04-09 ASSESSMENT — PATIENT HEALTH QUESTIONNAIRE - PHQ9: SUM OF ALL RESPONSES TO PHQ QUESTIONS 1-9: 12

## 2022-10-09 ENCOUNTER — HEALTH MAINTENANCE LETTER (OUTPATIENT)
Age: 42
End: 2022-10-09

## 2023-01-06 NOTE — PATIENT INSTRUCTIONS
Referral given for dermatology.     Stop taking the Clonazepam.       Preventive Health Recommendations  Male Ages 40 to 49    Yearly exam:             See your health care provider every year in order to  o   Review health changes.   o   Discuss preventive care.    o   Review your medicines if your doctor has prescribed any.    You should be tested each year for STDs (sexually transmitted diseases) if you re at risk.     Have a cholesterol test every 5 years.     Have a colonoscopy (test for colon cancer) if someone in your family has had colon cancer or polyps before age 50.     After age 45, have a diabetes test (fasting glucose). If you are at risk for diabetes, you should have this test every 3 years.      Talk with your health care provider about whether or not a prostate cancer screening test (PSA) is right for you.    Shots: Get a flu shot each year. Get a tetanus shot every 10 years.     Nutrition:    Eat at least 5 servings of fruits and vegetables daily.     Eat whole-grain bread, whole-wheat pasta and brown rice instead of white grains and rice.     Get adequate Calcium and Vitamin D.     Lifestyle    Exercise for at least 150 minutes a week (30 minutes a day, 5 days a week). This will help you control your weight and prevent disease.     Limit alcohol to one drink per day.     No smoking.     Wear sunscreen to prevent skin cancer.     See your dentist every six months for an exam and cleaning.      Preventive Health Recommendations  Male Ages 40 to 49    Yearly exam:             See your health care provider every year in order to  o   Review health changes.   o   Discuss preventive care.    o   Review your medicines if your doctor has prescribed any.    You should be tested each year for STDs (sexually transmitted diseases) if you re at risk.     Have a cholesterol test every 5 years.     Have a colonoscopy (test for colon cancer) if someone in your family has had colon cancer or polyps before age 50.      After age 45, have a diabetes test (fasting glucose). If you are at risk for diabetes, you should have this test every 3 years.      Talk with your health care provider about whether or not a prostate cancer screening test (PSA) is right for you.    Shots: Get a flu shot each year. Get a tetanus shot every 10 years.     Nutrition:    Eat at least 5 servings of fruits and vegetables daily.     Eat whole-grain bread, whole-wheat pasta and brown rice instead of white grains and rice.     Get adequate Calcium and Vitamin D.     Lifestyle    Exercise for at least 150 minutes a week (30 minutes a day, 5 days a week). This will help you control your weight and prevent disease.     Limit alcohol to one drink per day.     No smoking.     Wear sunscreen to prevent skin cancer.     See your dentist every six months for an exam and cleaning.       Counseling Text: I reviewed the side effect in detail. Patient should get monthly blood tests, not donate blood, not drive at night if vision affected, and not share medication.

## 2023-03-07 DIAGNOSIS — J98.01 BRONCHOSPASM: ICD-10-CM

## 2023-03-09 RX ORDER — ALBUTEROL SULFATE 90 UG/1
AEROSOL, METERED RESPIRATORY (INHALATION)
Qty: 18 G | Refills: 0 | Status: SHIPPED | OUTPATIENT
Start: 2023-03-09 | End: 2023-04-07

## 2023-04-05 DIAGNOSIS — J98.01 BRONCHOSPASM: ICD-10-CM

## 2023-04-07 RX ORDER — ALBUTEROL SULFATE 90 UG/1
AEROSOL, METERED RESPIRATORY (INHALATION)
Qty: 18 G | Refills: 0 | Status: SHIPPED | OUTPATIENT
Start: 2023-04-07

## 2023-05-27 ENCOUNTER — HEALTH MAINTENANCE LETTER (OUTPATIENT)
Age: 43
End: 2023-05-27

## 2023-07-18 ENCOUNTER — OFFICE VISIT (OUTPATIENT)
Dept: FAMILY MEDICINE | Facility: CLINIC | Age: 43
End: 2023-07-18
Payer: COMMERCIAL

## 2023-07-18 VITALS
TEMPERATURE: 97.3 F | HEART RATE: 70 BPM | OXYGEN SATURATION: 99 % | SYSTOLIC BLOOD PRESSURE: 135 MMHG | DIASTOLIC BLOOD PRESSURE: 82 MMHG | HEIGHT: 68 IN | RESPIRATION RATE: 20 BRPM | BODY MASS INDEX: 32.89 KG/M2 | WEIGHT: 217 LBS

## 2023-07-18 DIAGNOSIS — E78.2 MIXED HYPERLIPIDEMIA: ICD-10-CM

## 2023-07-18 DIAGNOSIS — N50.82 SCROTAL PAIN: ICD-10-CM

## 2023-07-18 DIAGNOSIS — Z00.00 ROUTINE GENERAL MEDICAL EXAMINATION AT A HEALTH CARE FACILITY: Primary | ICD-10-CM

## 2023-07-18 DIAGNOSIS — B36.0 TINEA VERSICOLOR: ICD-10-CM

## 2023-07-18 DIAGNOSIS — Z13.1 SCREENING FOR DIABETES MELLITUS: ICD-10-CM

## 2023-07-18 DIAGNOSIS — R07.9 CHEST PAIN, UNSPECIFIED TYPE: ICD-10-CM

## 2023-07-18 LAB
CHOLEST SERPL-MCNC: 200 MG/DL
HBA1C MFR BLD: 5.3 % (ref 0–5.6)
HDLC SERPL-MCNC: 30 MG/DL
LDLC SERPL CALC-MCNC: 111 MG/DL
NONHDLC SERPL-MCNC: 170 MG/DL
TRIGL SERPL-MCNC: 294 MG/DL

## 2023-07-18 PROCEDURE — 99396 PREV VISIT EST AGE 40-64: CPT | Performed by: FAMILY MEDICINE

## 2023-07-18 PROCEDURE — 36415 COLL VENOUS BLD VENIPUNCTURE: CPT | Performed by: FAMILY MEDICINE

## 2023-07-18 PROCEDURE — 80061 LIPID PANEL: CPT | Performed by: FAMILY MEDICINE

## 2023-07-18 PROCEDURE — 99214 OFFICE O/P EST MOD 30 MIN: CPT | Mod: 25 | Performed by: FAMILY MEDICINE

## 2023-07-18 PROCEDURE — 83036 HEMOGLOBIN GLYCOSYLATED A1C: CPT | Performed by: FAMILY MEDICINE

## 2023-07-18 RX ORDER — FLUCONAZOLE 150 MG/1
TABLET ORAL
Qty: 4 TABLET | Refills: 3 | Status: SHIPPED | OUTPATIENT
Start: 2023-07-18

## 2023-07-18 ASSESSMENT — ENCOUNTER SYMPTOMS
ABDOMINAL PAIN: 1
PARESTHESIAS: 1
JOINT SWELLING: 1
ARTHRALGIAS: 1
FREQUENCY: 1
HEADACHES: 1

## 2023-07-18 ASSESSMENT — PATIENT HEALTH QUESTIONNAIRE - PHQ9
SUM OF ALL RESPONSES TO PHQ QUESTIONS 1-9: 9
SUM OF ALL RESPONSES TO PHQ QUESTIONS 1-9: 9
10. IF YOU CHECKED OFF ANY PROBLEMS, HOW DIFFICULT HAVE THESE PROBLEMS MADE IT FOR YOU TO DO YOUR WORK, TAKE CARE OF THINGS AT HOME, OR GET ALONG WITH OTHER PEOPLE: EXTREMELY DIFFICULT

## 2023-07-18 ASSESSMENT — PAIN SCALES - GENERAL: PAINLEVEL: NO PAIN (0)

## 2023-07-18 NOTE — PROGRESS NOTES
Answers submitted by the patient for this visit:  Patient Health Questionnaire (Submitted on 7/18/2023)  If you checked off any problems, how difficult have these problems made it for you to do your work, take care of things at home, or get along with other people?: Extremely difficult  PHQ9 TOTAL SCORE: 9  Annual Preventive Visit (Submitted on 7/18/2023)  Chief Complaint: Annual Exam:  Frequency of exercise:: 2-3 days/week  Getting at least 3 servings of Calcium per day:: NO  Diet:: Other  Taking medications regularly:: Yes  Bi-annual eye exam:: Yes  Dental care twice a year:: NO  Sleep apnea or symptoms of sleep apnea:: Sleep apnea  abdominal pain: Yes  chest pain: Yes  ear pain: Yes  frequency: Yes  headaches: Yes  arthralgias: Yes  joint swelling: Yes  Skin sensation changes: Yes  urgency: Yes  rash: Yes  Additional concerns today:: Yes  Exercise outside of work (Submitted on 7/18/2023)  Chief Complaint: Annual Exam:  Duration of exercise:: Less than 15 minutes

## 2023-07-18 NOTE — NURSING NOTE
"Chief Complaint   Patient presents with     Physical     Psoriasis       Initial There were no vitals taken for this visit. Estimated body mass index is 32.09 kg/m  as calculated from the following:    Height as of 6/10/21: 1.735 m (5' 8.31\").    Weight as of 4/8/22: 96.6 kg (213 lb).    Patient presents to the clinic using No DME    Is there anyone who you would like to be able to receive your results? No  If yes have patient fill out MANE    "
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
## 2024-09-24 ENCOUNTER — MEDICAL CORRESPONDENCE (OUTPATIENT)
Dept: HEALTH INFORMATION MANAGEMENT | Facility: CLINIC | Age: 44
End: 2024-09-24

## 2024-10-10 ENCOUNTER — MEDICAL CORRESPONDENCE (OUTPATIENT)
Dept: HEALTH INFORMATION MANAGEMENT | Facility: CLINIC | Age: 44
End: 2024-10-10

## 2024-10-12 ENCOUNTER — HEALTH MAINTENANCE LETTER (OUTPATIENT)
Age: 44
End: 2024-10-12

## 2024-10-21 ENCOUNTER — TRANSCRIBE ORDERS (OUTPATIENT)
Dept: OTHER | Age: 44
End: 2024-10-21

## 2024-10-21 DIAGNOSIS — S89.92XA LEG INJURY, LEFT, INITIAL ENCOUNTER: Primary | ICD-10-CM

## 2024-10-22 ENCOUNTER — PATIENT OUTREACH (OUTPATIENT)
Dept: CARE COORDINATION | Facility: CLINIC | Age: 44
End: 2024-10-22

## 2024-10-24 ENCOUNTER — PATIENT OUTREACH (OUTPATIENT)
Dept: CARE COORDINATION | Facility: CLINIC | Age: 44
End: 2024-10-24

## 2024-10-31 ENCOUNTER — OFFICE VISIT (OUTPATIENT)
Dept: ORTHOPEDICS | Facility: CLINIC | Age: 44
End: 2024-10-31
Payer: OTHER MISCELLANEOUS

## 2024-10-31 ENCOUNTER — ANCILLARY PROCEDURE (OUTPATIENT)
Dept: GENERAL RADIOLOGY | Facility: CLINIC | Age: 44
End: 2024-10-31
Attending: FAMILY MEDICINE

## 2024-10-31 VITALS — WEIGHT: 233 LBS | BODY MASS INDEX: 35.31 KG/M2

## 2024-10-31 DIAGNOSIS — M54.12 RIGHT CERVICAL RADICULOPATHY: ICD-10-CM
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
## 2024-10-31 DIAGNOSIS — S59.901A INJURY OF RIGHT ELBOW, INITIAL ENCOUNTER: ICD-10-CM
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
## 2024-10-31 PROCEDURE — 73080 X-RAY EXAM OF ELBOW: CPT | Mod: TC | Performed by: STUDENT IN AN ORGANIZED HEALTH CARE EDUCATION/TRAINING PROGRAM

## 2024-10-31 PROCEDURE — 72040 X-RAY EXAM NECK SPINE 2-3 VW: CPT | Mod: TC | Performed by: RADIOLOGY

## 2024-10-31 PROCEDURE — 99204 OFFICE O/P NEW MOD 45 MIN: CPT | Mod: GC | Performed by: FAMILY MEDICINE

## 2024-10-31 RX ORDER — METHYLPREDNISOLONE 4 MG/1
TABLET ORAL
Qty: 21 TABLET | Refills: 0 | Status: SHIPPED | OUTPATIENT
Start: 2024-10-31

## 2024-10-31 RX ORDER — CYCLOBENZAPRINE HCL 10 MG
10 TABLET ORAL
Qty: 30 TABLET | Refills: 0 | Status: SHIPPED | OUTPATIENT
Start: 2024-10-31

## 2024-10-31 ASSESSMENT — PAIN SCALES - GENERAL: PAINLEVEL_OUTOF10: MILD PAIN (3)

## 2024-10-31 NOTE — LETTER
10/31/2024      Geovani Bella  [address] [id number]      Dear Colleague,    Thank you for referring your patient, Geovani Bella, to the [facility]. Please see a copy of my visit note below.    ASSESSMENT & PLAN    Geovani was seen today for pain.    Diagnoses and all orders for this visit:    Work related injury  -     Orthopedic  Referral  -     XR Elbow RT G/E 3 vw; Future  -     XR Cervical Spine 2/3 vws; Future  -     cyclobenzaprine (FLEXERIL) 10 MG tablet; Take 1 tablet (10 mg) by mouth nightly as needed for muscle spasms.  -     methylPREDNISolone (MEDROL DOSEPAK) 4 MG tablet therapy pack; Follow Package Directions  -     Physical Therapy  Referral; Future    Injury of right elbow, initial encounter  -     XR Elbow RT G/E 3 vw; Future    Right cervical radiculopathy  -     XR Cervical Spine 2/3 vws; Future  -     cyclobenzaprine (FLEXERIL) 10 MG tablet; Take 1 tablet (10 mg) by mouth nightly as needed for muscle spasms.  -     methylPREDNISolone (MEDROL DOSEPAK) 4 MG tablet therapy pack; Follow Package Directions  -     Physical Therapy  Referral; Future      # Right Cervical Radiculopathy, Work Injury: Geovani Bella  was seen today for right arm/neck injury while lifting at work 10/24/24. Symptoms had been going on since 10/24/24 after lifting a heavy table at work with right elbow/arm pain starting afterwards. On examination there are positive findings of right paracervical muscle tenderness to palpation, pain with right cervical nerve compression, pain with cervical compression. Imaging findings showed reversal of normal cervical curvature, mild elbow arthritis. Likely cause of patient's condition due to combination of right cervical radiculopathy, right elbow arthritis.  Counseled patient on nature of condition and treatment options.  Given this plan as below, follow-up 1 mon as needed.     Image  Findings: right elbow arthritis, reversal of cervical lordosis  Treatment: Activities as tolerated, home exercises given today  Job: As tolerated  Medications/Injections: Medrol dosepak, flexeril at night, Topical Voltaren gel, none  Follow-up: In one month if symptoms do not improve, sooner if worsening  Can consider repeat evaluation further imaging    Patient seen and discussed with attending physician Dr. Yamanaka Paola Toussaint Gonzalez, MD  Physical Medicine and Rehabilitation PGY-3    I was present with the resident during the history and exam.  I discussed the case with the resident and agree with the findings as documented in the assessment and plan.     This issue is acute and Worsening.    # Right Cervical Radiculopathy, Work Injury: Geovani Bella  was seen today for right arm/neck injury while lifting at work 10/24/24. Symptoms had been going on since 10/24/24 after lifting a heavy table at work with right elbow/arm pain starting afterwards. On examination there are positive findings of right paracervical muscle tenderness to palpation, pain with right cervical nerve compression, pain with cervical compression. Imaging findings showed reversal of normal cervical curvature, mild elbow arthritis. Likely cause of patient's condition due to combination of right cervical radiculopathy, right elbow arthritis.  Counseled patient on nature of condition and treatment options.  Given this plan as below, follow-up 1 mon as needed.     Image Findings: right elbow arthritis, reversal of cervical lordosis  Treatment: Activities as tolerated, home exercises given today  Job: As tolerated  Medications/Injections: Medrol dosepak, flexeril at night, Topical Voltaren gel, none  Follow-up: In one month if symptoms do not improve, sooner if worsening  Can consider repeat evaluation further imaging    Sorin Estrada MD  [facility] [place]    -----  Chief Complaint   Patient presents  with     Right Elbow - Pain       SUBJECTIVE  Geovani Bella is a/an 44 year old male who is seen in consultation at the request of  Ken Eagle DC for evaluation of right elbow pain.     The patient is seen by themselves.  The patient is Right handed    Onset: 10/24/24, 1 week(s) ago. Patient describes injury as carrying/lifting heavy table at work.   Location of Pain: right anterior elbow   Worsened by: gripping, lifting, twisting   Better with: rest   Treatments tried: rest/activity avoidance and massage  Associated symptoms: numbness and tingling in right arm to hands     Orthopedic/Surgical history: NO  Social History/Occupation: [employer]     No family history pertinent to patient's problem today.      REVIEW OF SYSTEMS:  Review of Systems  Constitutional, HEENT, cardiovascular, pulmonary, gi and gu systems are negative, except as otherwise noted.    OBJECTIVE:  Wt 105.7 kg (233 lb)   BMI 35.31 kg/m     General: healthy, alert and in no distress  HEENT: no scleral icterus or conjunctival erythema  Skin: no suspicious lesions or rash. No jaundice.  CV: distal perfusion intact    Resp: normal respiratory effort without conversational dyspnea   Psych: normal mood and affect  Gait: normal steady gait with appropriate coordination and balance    Neuro: Normal light sensory exam of right upper extremity      Ortho Exam   RIGHT SHOULDER  Inspection:    no swelling, bruising, discoloration, or obvious deformity or asymmetry  Active Range of Motion:     Abduction normal0, FF normal0, ER normal0, IR normal.     RIGHT ELBOW  Inspection:    No swelling, bruising, discoloration, or obvious deformity or asymmetry  Palpation:  No TTP  Remainder of bony, ligamentous and tendinous landmarks are nontender.    Crepitus is Absent  Range of Motion:     Extension full / flexion full mild pain at end flexion/ pronation full / supination full  Strength:    No deficits in flexion, extension, pronation, or  supination.  Special Tests:    Positive: none    Negative: Pain with resisted wrist extension, pain with resisted wrist flexion, pain with resisted supination, pain with resisted pronation      CERVICAL SPINE  Inspection:    Rectified cervical lordosis present, rounded shoulders, forward head posture  Palpation:    right paracervical muscle tenderness  Range of Motion:     Flexion full    Extension full    Right side bend limited by pain and numbness elicited in arm    Left side bend full    Right rotation limited by pain and numbness elicited in arm    Left rotation full  Strength:    Full strength throughout all neck muscles C5-T1 on the right upper extremity  Special Tests:    Positive: Spurling's on the right side    Negative: Spurling's on the left side    RADIOLOGY:  I independently ordered, visualized and reviewed these images with the patient  Reversal of normal cervical lordosis, mild right elbow arthritis      Review of external notes as documented elsewhere in note  Review of the result(s) of each unique test - cervical and right shoulder x-rays      Disclaimer: This note consists of symbols derived from keyboarding, dictation and/or voice recognition software. As a result, there may be errors in the script that have gone undetected. Please consider this when interpreting information found in this chart.      Again, thank you for allowing me to participate in the care of your patient.        Sincerely,        Sorin Estrada MD

## 2024-10-31 NOTE — PATIENT INSTRUCTIONS
# Right Cervical Radiculopathy, Work Injury: Geovani Bella  was seen today for right arm/neck injury while lifting at work 10/24/24. Symptoms had been going on since 10/24/24 after lifting a heavy table at work with right elbow/arm pain starting afterwards. On examination there are positive findings of right paracervical muscle tenderness to palpation, pain with right cervical nerve compression, pain with cervical compression. Imaging findings showed reversal of normal cervical curvature, mild elbow arthritis. Likely cause of patient's condition due to combination of right cervical radiculopathy, right elbow arthritis.  Counseled patient on nature of condition and treatment options.  Given this plan as below, follow-up 1 mon as needed.     Image Findings: right elbow arthritis, reversal of cervical lordosis  Treatment: Activities as tolerated, home exercises given today  Job: As tolerated  Medications/Injections: Medrol dosepak, flexeril at night, Topical Voltaren gel, none  Follow-up: In one month if symptoms do not improve, sooner if worsening  Can consider repeat evaluation further imaging    Please call [phone]   Ask for my team if you have any questions or concerns    If you have not yet received the influenza vaccine but would like to get one, please call  [phone] or you can schedule via 1DayLater    It was great seeing you today!    Sorin Estrada MD, [facility]

## 2024-10-31 NOTE — PROGRESS NOTES
ASSESSMENT & PLAN    Geovani was seen today for pain.    Diagnoses and all orders for this visit:    Work related injury  -     Orthopedic  Referral  -     XR Elbow RT G/E 3 vw; Future  -     XR Cervical Spine 2/3 vws; Future  -     cyclobenzaprine (FLEXERIL) 10 MG tablet; Take 1 tablet (10 mg) by mouth nightly as needed for muscle spasms.  -     methylPREDNISolone (MEDROL DOSEPAK) 4 MG tablet therapy pack; Follow Package Directions  -     Physical Therapy  Referral; Future    Injury of right elbow, initial encounter  -     XR Elbow RT G/E 3 vw; Future    Right cervical radiculopathy  -     XR Cervical Spine 2/3 vws; Future  -     cyclobenzaprine (FLEXERIL) 10 MG tablet; Take 1 tablet (10 mg) by mouth nightly as needed for muscle spasms.  -     methylPREDNISolone (MEDROL DOSEPAK) 4 MG tablet therapy pack; Follow Package Directions  -     Physical Therapy  Referral; Future      # Right Cervical Radiculopathy, Work Injury: Geovani Bella  was seen today for right arm/neck injury while lifting at work 10/24/24. Symptoms had been going on since 10/24/24 after lifting a heavy table at work with right elbow/arm pain starting afterwards. On examination there are positive findings of right paracervical muscle tenderness to palpation, pain with right cervical nerve compression, pain with cervical compression. Imaging findings showed reversal of normal cervical curvature, mild elbow arthritis. Likely cause of patient's condition due to combination of right cervical radiculopathy, right elbow arthritis.  Counseled patient on nature of condition and treatment options.  Given this plan as below, follow-up 1 mon as needed.     Image Findings: right elbow arthritis, reversal of cervical lordosis  Treatment: Activities as tolerated, home exercises given today  Job: As tolerated  Medications/Injections: Medrol dosepak, flexeril at night, Topical Voltaren gel, none  Follow-up: In one month if  symptoms do not improve, sooner if worsening  Can consider repeat evaluation further imaging    Patient seen and discussed with attending physician Dr. Yamanaka Paola Toussaint Gonzalez, MD  Physical Medicine and Rehabilitation PGY-3    I was present with the resident during the history and exam.  I discussed the case with the resident and agree with the findings as documented in the assessment and plan.     This issue is acute and Worsening.    # Right Cervical Radiculopathy, Work Injury: Geovani Bella  was seen today for right arm/neck injury while lifting at work 10/24/24. Symptoms had been going on since 10/24/24 after lifting a heavy table at work with right elbow/arm pain starting afterwards. On examination there are positive findings of right paracervical muscle tenderness to palpation, pain with right cervical nerve compression, pain with cervical compression. Imaging findings showed reversal of normal cervical curvature, mild elbow arthritis. Likely cause of patient's condition due to combination of right cervical radiculopathy, right elbow arthritis.  Counseled patient on nature of condition and treatment options.  Given this plan as below, follow-up 1 mon as needed.     Image Findings: right elbow arthritis, reversal of cervical lordosis  Treatment: Activities as tolerated, home exercises given today  Job: As tolerated  Medications/Injections: Medrol dosepak, flexeril at night, Topical Voltaren gel, none  Follow-up: In one month if symptoms do not improve, sooner if worsening  Can consider repeat evaluation further imaging    Sorin Estrada MD  [facility] [place]    -----  Chief Complaint   Patient presents with    Right Elbow - Pain       SUBJECTIVE  Geovani Bella is a/an 44 year old male who is seen in consultation at the request of  Ken Eagle DC for evaluation of right elbow pain.     The patient is seen by themselves.  The patient is Right  handed    Onset: 10/24/24, 1 week(s) ago. Patient describes injury as carrying/lifting heavy table at work.   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      REVIEW OF SYSTEMS:  Review of Systems  Constitutional, HEENT, cardiovascular, pulmonary, gi and gu systems are negative, except as otherwise noted.    OBJECTIVE:  Wt 105.7 kg (233 lb)   BMI 35.31 kg/m     General: healthy, alert and in no distress  HEENT: no scleral icterus or conjunctival erythema  Skin: no suspicious lesions or rash. No jaundice.  CV: distal perfusion intact    Resp: normal respiratory effort without conversational dyspnea   Psych: normal mood and affect  Gait: normal steady gait with appropriate coordination and balance    Neuro: Normal light sensory exam of right upper extremity      Ortho Exam   RIGHT SHOULDER  Inspection:    no swelling, bruising, discoloration, or obvious deformity or asymmetry  Active Range of Motion:     Abduction normal0, FF normal0, ER normal0, IR normal.     RIGHT ELBOW  Inspection:    No swelling, bruising, discoloration, or obvious deformity or asymmetry  Palpation:  No TTP  Remainder of bony, ligamentous and tendinous landmarks are nontender.    Crepitus is Absent  Range of Motion:     Extension full / flexion full mild pain at end flexion/ pronation full / supination full  Strength:    No deficits in flexion, extension, pronation, or supination.  Special Tests:    Positive: none    Negative: Pain with resisted wrist extension, pain with resisted wrist flexion, pain with resisted supination, pain with resisted pronation      CERVICAL SPINE  Inspection:    Rectified cervical lordosis present,  rounded shoulders, forward head posture  Palpation:    right paracervical muscle tenderness  Range of Motion:     Flexion full    Extension full    Right side bend limited by pain and numbness elicited in arm    Left side bend full    Right rotation limited by pain and numbness elicited in arm    Left rotation full  Strength:    Full strength throughout all neck muscles C5-T1 on the right upper extremity  Special Tests:    Positive: Spurling's on the right side    Negative: Spurling's on the left side    RADIOLOGY:  I independently ordered, visualized and reviewed these images with the patient  Reversal of normal cervical lordosis, mild right elbow arthritis      Review of external notes as documented elsewhere in note  Review of the result(s) of each unique test - cervical and right shoulder x-rays      Disclaimer: This note consists of symbols derived from keyboarding, dictation and/or voice recognition software. As a result, there may be errors in the script that have gone undetected. Please consider this when interpreting information found in this chart.

## 2025-06-30 ENCOUNTER — TRANSFERRED RECORDS (OUTPATIENT)
Dept: HEALTH INFORMATION MANAGEMENT | Facility: CLINIC | Age: 45
End: 2025-06-30

## (undated) DEVICE — SOL NACL 0.9% IRRIG 3000ML BAG 07972-08

## (undated) DEVICE — GLOVE PROTEXIS POWDER FREE 8.0 ORTHOPEDIC 2D73ET80

## (undated) DEVICE — Device

## (undated) DEVICE — GLOVE PROTEXIS W/NEU-THERA 7.5  2D73TE75

## (undated) DEVICE — DECANTER TRANSFER DEVICE 2008S

## (undated) DEVICE — PACK ARTHROSCOPY KNEE SOP15AKFSM

## (undated) DEVICE — SU PROLENE 3-0 PS-2 18" 8687H

## (undated) DEVICE — GLOVE PROTEXIS W/NEU-THERA 6.5  2D73TE65

## (undated) DEVICE — DRAPE STERI U 1015

## (undated) DEVICE — SOL WATER IRRIG 1000ML BOTTLE 07139-09

## (undated) DEVICE — GLOVE PROTEXIS BLUE W/NEU-THERA 7.5  2D73EB75

## (undated) DEVICE — BNDG ELASTIC 6"X5YDS UNSTERILE 6611-60

## (undated) DEVICE — DRAPE SHEET 3/4 78X60"

## (undated) DEVICE — DRSG STERI STRIP 1/2X4" R1547

## (undated) DEVICE — GLOVE PROTEXIS BLUE W/NEU-THERA 6.5  2D73EB65

## (undated) DEVICE — GLOVE PROTEXIS W/NEU-THERA 8.0  2D73TE80